# Patient Record
Sex: MALE | Race: OTHER | Employment: PART TIME | URBAN - METROPOLITAN AREA
[De-identification: names, ages, dates, MRNs, and addresses within clinical notes are randomized per-mention and may not be internally consistent; named-entity substitution may affect disease eponyms.]

---

## 2017-05-22 ENCOUNTER — MA CHART PREP (OUTPATIENT)
Dept: FAMILY MEDICINE CLINIC | Facility: CLINIC | Age: 72
End: 2017-05-22

## 2017-05-22 PROBLEM — K57.90 DIVERTICULOSIS: Status: ACTIVE | Noted: 2017-05-22

## 2017-05-22 PROBLEM — M47.816 DEGENERATIVE ARTHRITIS OF LUMBAR SPINE: Status: ACTIVE | Noted: 2017-05-22

## 2017-05-22 PROBLEM — I25.2 HISTORY OF MI (MYOCARDIAL INFARCTION): Status: ACTIVE | Noted: 2017-05-22

## 2017-05-22 PROBLEM — I10 HYPERTENSION: Status: ACTIVE | Noted: 2017-05-22

## 2017-05-22 PROBLEM — R97.20 ELEVATED PSA: Status: ACTIVE | Noted: 2017-05-22

## 2017-05-22 PROBLEM — N40.0 BPH (BENIGN PROSTATIC HYPERPLASIA): Status: ACTIVE | Noted: 2017-05-22

## 2017-05-23 ENCOUNTER — LAB ENCOUNTER (OUTPATIENT)
Dept: LAB | Age: 72
End: 2017-05-23
Attending: INTERNAL MEDICINE
Payer: MEDICARE

## 2017-05-23 ENCOUNTER — OFFICE VISIT (OUTPATIENT)
Dept: INTERNAL MEDICINE CLINIC | Facility: CLINIC | Age: 72
End: 2017-05-23

## 2017-05-23 VITALS
SYSTOLIC BLOOD PRESSURE: 124 MMHG | OXYGEN SATURATION: 96 % | RESPIRATION RATE: 16 BRPM | WEIGHT: 211.5 LBS | TEMPERATURE: 98 F | DIASTOLIC BLOOD PRESSURE: 80 MMHG | BODY MASS INDEX: 35.67 KG/M2 | HEART RATE: 81 BPM | HEIGHT: 64.5 IN

## 2017-05-23 DIAGNOSIS — D50.9 IRON DEFICIENCY ANEMIA, UNSPECIFIED IRON DEFICIENCY ANEMIA TYPE: ICD-10-CM

## 2017-05-23 DIAGNOSIS — E66.01 SEVERE OBESITY (BMI 35.0-39.9) WITH COMORBIDITY (HCC): Chronic | ICD-10-CM

## 2017-05-23 DIAGNOSIS — E78.2 MIXED HYPERLIPIDEMIA: ICD-10-CM

## 2017-05-23 DIAGNOSIS — I25.10 CORONARY ARTERY DISEASE INVOLVING NATIVE CORONARY ARTERY OF NATIVE HEART WITHOUT ANGINA PECTORIS: ICD-10-CM

## 2017-05-23 DIAGNOSIS — R73.01 IMPAIRED FASTING GLUCOSE: ICD-10-CM

## 2017-05-23 DIAGNOSIS — Z00.00 ENCOUNTER FOR ANNUAL HEALTH EXAMINATION: Primary | ICD-10-CM

## 2017-05-23 DIAGNOSIS — Z01.00 EYE EXAM, ROUTINE: ICD-10-CM

## 2017-05-23 DIAGNOSIS — Z00.00 ENCOUNTER FOR ANNUAL HEALTH EXAMINATION: ICD-10-CM

## 2017-05-23 DIAGNOSIS — R97.20 ELEVATED PSA: ICD-10-CM

## 2017-05-23 DIAGNOSIS — M47.816 FACET ARTHRITIS OF LUMBAR REGION: ICD-10-CM

## 2017-05-23 PROBLEM — I25.2 HISTORY OF MI (MYOCARDIAL INFARCTION): Status: RESOLVED | Noted: 2017-05-22 | Resolved: 2017-05-23

## 2017-05-23 PROBLEM — K57.90 DIVERTICULOSIS: Status: RESOLVED | Noted: 2017-05-22 | Resolved: 2017-05-23

## 2017-05-23 PROCEDURE — 84154 ASSAY OF PSA FREE: CPT | Performed by: INTERNAL MEDICINE

## 2017-05-23 PROCEDURE — 84450 TRANSFERASE (AST) (SGOT): CPT | Performed by: INTERNAL MEDICINE

## 2017-05-23 PROCEDURE — 80048 BASIC METABOLIC PNL TOTAL CA: CPT | Performed by: INTERNAL MEDICINE

## 2017-05-23 PROCEDURE — 80061 LIPID PANEL: CPT | Performed by: INTERNAL MEDICINE

## 2017-05-23 PROCEDURE — 84460 ALANINE AMINO (ALT) (SGPT): CPT | Performed by: INTERNAL MEDICINE

## 2017-05-23 PROCEDURE — 36415 COLL VENOUS BLD VENIPUNCTURE: CPT | Performed by: INTERNAL MEDICINE

## 2017-05-23 PROCEDURE — 99397 PER PM REEVAL EST PAT 65+ YR: CPT | Performed by: INTERNAL MEDICINE

## 2017-05-23 PROCEDURE — G0439 PPPS, SUBSEQ VISIT: HCPCS | Performed by: INTERNAL MEDICINE

## 2017-05-23 PROCEDURE — 82728 ASSAY OF FERRITIN: CPT | Performed by: INTERNAL MEDICINE

## 2017-05-23 PROCEDURE — G0403 EKG FOR INITIAL PREVENT EXAM: HCPCS | Performed by: INTERNAL MEDICINE

## 2017-05-23 PROCEDURE — 96160 PT-FOCUSED HLTH RISK ASSMT: CPT | Performed by: INTERNAL MEDICINE

## 2017-05-23 PROCEDURE — 85025 COMPLETE CBC W/AUTO DIFF WBC: CPT | Performed by: INTERNAL MEDICINE

## 2017-05-23 PROCEDURE — 83036 HEMOGLOBIN GLYCOSYLATED A1C: CPT | Performed by: INTERNAL MEDICINE

## 2017-05-23 NOTE — PATIENT INSTRUCTIONS
I do advise you get the TDAP (tetanus, diptheriae, pertussis) vaccine every 10 years but it can cost up to $65.    I advise you get the annual flu shot every September, October.     Please return in 6 months to repeat your memory test.    Please get your bl

## 2017-05-23 NOTE — PROGRESS NOTES
HPI:   Marine Peck is a 66year old male who presents for a medicare supervisit and additional issues as noted below. 1. Cognitive testing: brief screen was abnormal so I performed a full MMSE and he scored 25/30.  Sometimes he has trouble rememberi 08/16/2016   * 08/16/2016        CBC  (most recent labs)     Lab Results  Component Value Date   WBC 4.8 08/03/2016   HGB 11.8* 08/03/2016   .0 08/03/2016        ALLERGIES:   He is allergic to lipitor and pcn.     CURRENT MEDICATIONS:     No o score 0 today.    HEMATOLOGIC: has microcytic, iron deficiency anemia  ENDOCRINE: denies thyroid history  ALL/ASTHMA: denies hx of allergy or asthma    EXAM:   /80 mmHg  Pulse 81  Temp(Src) 98.2 °F (36.8 °C) (Oral)  Resp 16  Ht 64.5\"  Wt 211 lb 8 oz Below      [facility] on file in Epic:    Alexandre Burgess does not have a Power of  for [employer] on file in [facility] See Below         PLAN:  The patient indicates understanding of these issues and agrees to the plan.     No Follow- Scoring: 3    Scoring Interpretation: 0 - 3 No Risk     Depression Screening (PHQ-2/PHQ-9): Over the LAST 2 WEEKS   Little interest or pleasure in doing things (over the last two weeks)?: Not at all    Feeling down, depressed, or hopeless (over the last tw Fecal Occult Blood Annually No results found for: FOB No flowsheet data found. Glaucoma Screening      Ophthalmology Visit Annually: Diabetics, FHx Glaucoma, AA>50, > 65 No flowsheet data found.     Prostate Cancer Screening      PSA  Annuall No flowsheet data found.           ASSESSMENT/PLAN:  # Possible Cognitive Impairment: no complaints per patient and MMSE 25/30 today which is technically normal. Repeat in 6 months to look for any changes  # Impaired Fasting Glucose, Severe obesity with CAD MMSE.   Mai Dietz MD

## 2017-12-08 DIAGNOSIS — E78.2 MIXED HYPERLIPIDEMIA: ICD-10-CM

## 2017-12-08 DIAGNOSIS — I25.10 CORONARY ARTERY DISEASE INVOLVING NATIVE CORONARY ARTERY OF NATIVE HEART WITHOUT ANGINA PECTORIS: ICD-10-CM

## 2017-12-08 DIAGNOSIS — R97.20 ELEVATED PSA: Primary | ICD-10-CM

## 2017-12-11 RX ORDER — SIMVASTATIN 10 MG
10 TABLET ORAL NIGHTLY
Qty: 30 TABLET | Refills: 1
Start: 2017-12-11

## 2017-12-11 NOTE — TELEPHONE ENCOUNTER
I denied his simvastatin refill because he is overdue for labs. Please call and notify him. Orders in are in.

## 2017-12-11 NOTE — TELEPHONE ENCOUNTER
Requesting Simvastatin 10 mg  LOV: 5/23/17  RTC: 11/23/17  Last Relevant Labs:5/23/17  Filled: 5/24/17 #30 with 1 refills    Future Appointments  Date Time Provider Vikram Owens   1/17/2018 9:00 AM Nelida Steward MD [facility] [place]

## 2017-12-20 ENCOUNTER — LAB ENCOUNTER (OUTPATIENT)
Dept: LAB | Age: 72
End: 2017-12-20
Attending: INTERNAL MEDICINE
Payer: MEDICARE

## 2017-12-20 DIAGNOSIS — R97.20 ELEVATED PSA: ICD-10-CM

## 2017-12-20 DIAGNOSIS — I25.10 CORONARY ARTERY DISEASE INVOLVING NATIVE CORONARY ARTERY OF NATIVE HEART WITHOUT ANGINA PECTORIS: ICD-10-CM

## 2017-12-20 DIAGNOSIS — E78.2 MIXED HYPERLIPIDEMIA: ICD-10-CM

## 2017-12-20 PROCEDURE — 84154 ASSAY OF PSA FREE: CPT | Performed by: INTERNAL MEDICINE

## 2017-12-20 PROCEDURE — 84460 ALANINE AMINO (ALT) (SGPT): CPT | Performed by: INTERNAL MEDICINE

## 2017-12-20 PROCEDURE — 80061 LIPID PANEL: CPT | Performed by: INTERNAL MEDICINE

## 2017-12-20 PROCEDURE — 80048 BASIC METABOLIC PNL TOTAL CA: CPT | Performed by: INTERNAL MEDICINE

## 2017-12-20 PROCEDURE — 84153 ASSAY OF PSA TOTAL: CPT | Performed by: INTERNAL MEDICINE

## 2017-12-20 PROCEDURE — 36415 COLL VENOUS BLD VENIPUNCTURE: CPT | Performed by: INTERNAL MEDICINE

## 2017-12-20 PROCEDURE — 84450 TRANSFERASE (AST) (SGOT): CPT | Performed by: INTERNAL MEDICINE

## 2018-01-17 ENCOUNTER — OFFICE VISIT (OUTPATIENT)
Dept: INTERNAL MEDICINE CLINIC | Facility: CLINIC | Age: 73
End: 2018-01-17

## 2018-01-17 VITALS
BODY MASS INDEX: 34.66 KG/M2 | TEMPERATURE: 99 F | DIASTOLIC BLOOD PRESSURE: 80 MMHG | RESPIRATION RATE: 16 BRPM | OXYGEN SATURATION: 96 % | WEIGHT: 208 LBS | HEIGHT: 65 IN | SYSTOLIC BLOOD PRESSURE: 132 MMHG | HEART RATE: 66 BPM

## 2018-01-17 DIAGNOSIS — M47.816 FACET ARTHRITIS OF LUMBAR REGION: ICD-10-CM

## 2018-01-17 DIAGNOSIS — R97.20 ELEVATED PSA: ICD-10-CM

## 2018-01-17 DIAGNOSIS — E78.2 MIXED HYPERLIPIDEMIA: ICD-10-CM

## 2018-01-17 DIAGNOSIS — Z00.00 ENCOUNTER FOR ANNUAL HEALTH EXAMINATION: Primary | ICD-10-CM

## 2018-01-17 DIAGNOSIS — R73.01 IMPAIRED FASTING GLUCOSE: ICD-10-CM

## 2018-01-17 DIAGNOSIS — I25.10 CORONARY ARTERY DISEASE INVOLVING NATIVE CORONARY ARTERY OF NATIVE HEART WITHOUT ANGINA PECTORIS: ICD-10-CM

## 2018-01-17 DIAGNOSIS — E66.01 MORBIDLY OBESE (HCC): ICD-10-CM

## 2018-01-17 PROCEDURE — G0439 PPPS, SUBSEQ VISIT: HCPCS | Performed by: INTERNAL MEDICINE

## 2018-01-17 PROCEDURE — 99397 PER PM REEVAL EST PAT 65+ YR: CPT | Performed by: INTERNAL MEDICINE

## 2018-01-17 PROCEDURE — 96160 PT-FOCUSED HLTH RISK ASSMT: CPT | Performed by: INTERNAL MEDICINE

## 2018-01-17 NOTE — PROGRESS NOTES
HPI:   Brittani Caballero is a 67year old male who presents for a medicare wellness visit and additional issues as noted below. 1. HLP: doing well on simvastatin  2. Obesity: down 3 lbs from last visit.    3. Alcohol Use: would drink 6-7 beers/bloody Enrrique Siurick Nikko was screened for Alcohol abuse and had a score of 0 so is at low risk.      Patient Care Team: Patient Care Team:  Juan Ramon Al MD as PCP - General (Internal Medicine)  Marielos Phan MD as Consulting Physician ([facility] [address])    Patient Active P colonoscopy (N/A, 5/13/2016). His family history includes Diabetes in his mother. SOCIAL HISTORY:   He  reports that he has never smoked. He has never used smokeless tobacco. He reports that he drinks alcohol. He reports that he does not use drugs. edema       Visual Acuity  Right Eye Visual Acuity: Uncorrected Right Eye Chart Acuity: 20/30   Left Eye Visual Acuity: Uncorrected Left Eye Chart Acuity: 20/30   Both Eyes Visual Acuity: Uncorrected Both Eyes Chart Acuity: 20/25   Able To Tolerate Visual 12/20/2017 99   ----------  GLUCOSE (mg/dL)   Date Value   04/28/2014 105 (H)   12/07/2011 102 (H)   ----------    Cardiovascular Disease Screening     LDL Annually LDL-CHOLESTEROL (mg/dL (calc))   Date Value   12/07/2011 91     LDL CHOLESTROL (mg/dL) Medically needed    Zoster   Not covered by Medicare Part B No vaccine history found This may be covered with your pharmacy  prescription benefits       Assessment/Plan:  # Morbid Obesity, BMI 34 with co-morbidities: applauded on lifestyle changes.    # Imp

## 2018-01-17 NOTE — PATIENT INSTRUCTIONS
Goals for Weight Loss:  1. Please try to eat breakfast every morning. 2. Small, frequent meals improves your metabolism and promotes weight loss  3. Your portions should be a dinner plate.  1/2 should be vegetables, 1/4 lean protein (chicken, fish, turkey)

## 2018-03-21 ENCOUNTER — LAB ENCOUNTER (OUTPATIENT)
Dept: LAB | Age: 73
End: 2018-03-21
Attending: INTERNAL MEDICINE
Payer: MEDICARE

## 2018-03-21 DIAGNOSIS — Z00.00 ENCOUNTER FOR ANNUAL HEALTH EXAMINATION: ICD-10-CM

## 2018-03-21 DIAGNOSIS — I25.10 CORONARY ARTERY DISEASE INVOLVING NATIVE CORONARY ARTERY OF NATIVE HEART WITHOUT ANGINA PECTORIS: ICD-10-CM

## 2018-03-21 DIAGNOSIS — E78.2 MIXED HYPERLIPIDEMIA: ICD-10-CM

## 2018-03-21 DIAGNOSIS — R73.01 IMPAIRED FASTING GLUCOSE: ICD-10-CM

## 2018-03-21 DIAGNOSIS — R97.20 ELEVATED PSA: ICD-10-CM

## 2018-03-21 LAB
ALT SERPL-CCNC: 20 U/L (ref 17–63)
AST SERPL-CCNC: 25 U/L (ref 15–41)
BASOPHILS # BLD AUTO: 0.04 X10(3) UL (ref 0–0.1)
BASOPHILS NFR BLD AUTO: 0.8 %
CHOLEST SMN-MCNC: 126 MG/DL (ref ?–200)
EOSINOPHIL # BLD AUTO: 0.12 X10(3) UL (ref 0–0.3)
EOSINOPHIL NFR BLD AUTO: 2.3 %
ERYTHROCYTE [DISTWIDTH] IN BLOOD BY AUTOMATED COUNT: 17.8 % (ref 11.5–16)
EST. AVERAGE GLUCOSE BLD GHB EST-MCNC: 128 MG/DL (ref 68–126)
HBA1C MFR BLD HPLC: 6.1 % (ref ?–5.7)
HCT VFR BLD AUTO: 43 % (ref 37–53)
HDLC SERPL-MCNC: 65 MG/DL (ref 45–?)
HDLC SERPL: 1.94 {RATIO} (ref ?–4.97)
HGB BLD-MCNC: 13.5 G/DL (ref 13–17)
IMMATURE GRANULOCYTE COUNT: 0.02 X10(3) UL (ref 0–1)
IMMATURE GRANULOCYTE RATIO %: 0.4 %
LDLC SERPL CALC-MCNC: 45 MG/DL (ref ?–130)
LYMPHOCYTES # BLD AUTO: 1.83 X10(3) UL (ref 0.9–4)
LYMPHOCYTES NFR BLD AUTO: 34.8 %
MCH RBC QN AUTO: 28.4 PG (ref 27–33.2)
MCHC RBC AUTO-ENTMCNC: 31.4 G/DL (ref 31–37)
MCV RBC AUTO: 90.3 FL (ref 80–99)
MONOCYTES # BLD AUTO: 0.53 X10(3) UL (ref 0.1–1)
MONOCYTES NFR BLD AUTO: 10.1 %
NEUTROPHIL ABS PRELIM: 2.72 X10 (3) UL (ref 1.3–6.7)
NEUTROPHILS # BLD AUTO: 2.72 X10(3) UL (ref 1.3–6.7)
NEUTROPHILS NFR BLD AUTO: 51.6 %
NONHDLC SERPL-MCNC: 61 MG/DL (ref ?–130)
PLATELET # BLD AUTO: 237 10(3)UL (ref 150–450)
RBC # BLD AUTO: 4.76 X10(6)UL (ref 3.8–5.8)
RED CELL DISTRIBUTION WIDTH-SD: 59.3 FL (ref 35.1–46.3)
TRIGL SERPL-MCNC: 78 MG/DL (ref ?–150)
VLDLC SERPL CALC-MCNC: 16 MG/DL (ref 5–40)
WBC # BLD AUTO: 5.3 X10(3) UL (ref 4–13)

## 2018-03-21 PROCEDURE — 84460 ALANINE AMINO (ALT) (SGPT): CPT | Performed by: INTERNAL MEDICINE

## 2018-03-21 PROCEDURE — 36415 COLL VENOUS BLD VENIPUNCTURE: CPT | Performed by: INTERNAL MEDICINE

## 2018-03-21 PROCEDURE — 84450 TRANSFERASE (AST) (SGOT): CPT | Performed by: INTERNAL MEDICINE

## 2018-03-21 PROCEDURE — 85025 COMPLETE CBC W/AUTO DIFF WBC: CPT | Performed by: INTERNAL MEDICINE

## 2018-03-21 PROCEDURE — 80061 LIPID PANEL: CPT | Performed by: INTERNAL MEDICINE

## 2018-03-21 PROCEDURE — 83036 HEMOGLOBIN GLYCOSYLATED A1C: CPT | Performed by: INTERNAL MEDICINE

## 2018-04-15 DIAGNOSIS — E78.2 MIXED HYPERLIPIDEMIA: ICD-10-CM

## 2018-04-15 DIAGNOSIS — R97.20 ELEVATED PSA: ICD-10-CM

## 2018-04-15 DIAGNOSIS — Z00.00 ENCOUNTER FOR ANNUAL HEALTH EXAMINATION: ICD-10-CM

## 2018-04-15 DIAGNOSIS — E66.01 SEVERE OBESITY (BMI 35.0-39.9) WITH COMORBIDITY (HCC): Chronic | ICD-10-CM

## 2018-04-15 DIAGNOSIS — I25.10 CORONARY ARTERY DISEASE INVOLVING NATIVE CORONARY ARTERY OF NATIVE HEART WITHOUT ANGINA PECTORIS: ICD-10-CM

## 2018-04-15 DIAGNOSIS — R73.01 IMPAIRED FASTING GLUCOSE: ICD-10-CM

## 2018-04-16 RX ORDER — SIMVASTATIN 40 MG
TABLET ORAL
Qty: 90 TABLET | Refills: 0 | Status: SHIPPED | OUTPATIENT
Start: 2018-04-16 | End: 2019-01-16

## 2019-01-16 ENCOUNTER — OFFICE VISIT (OUTPATIENT)
Dept: INTERNAL MEDICINE CLINIC | Facility: CLINIC | Age: 74
End: 2019-01-16
Payer: COMMERCIAL

## 2019-01-16 VITALS
TEMPERATURE: 98 F | OXYGEN SATURATION: 98 % | DIASTOLIC BLOOD PRESSURE: 62 MMHG | SYSTOLIC BLOOD PRESSURE: 128 MMHG | HEIGHT: 65 IN | HEART RATE: 96 BPM | BODY MASS INDEX: 34.95 KG/M2 | RESPIRATION RATE: 16 BRPM | WEIGHT: 209.75 LBS

## 2019-01-16 DIAGNOSIS — E78.2 MIXED HYPERLIPIDEMIA: ICD-10-CM

## 2019-01-16 DIAGNOSIS — M47.816 FACET ARTHRITIS OF LUMBAR REGION: ICD-10-CM

## 2019-01-16 DIAGNOSIS — Z00.00 ENCOUNTER FOR ANNUAL HEALTH EXAMINATION: ICD-10-CM

## 2019-01-16 DIAGNOSIS — Z77.098 AGENT ORANGE EXPOSURE: ICD-10-CM

## 2019-01-16 DIAGNOSIS — E66.01 MORBIDLY OBESE (HCC): ICD-10-CM

## 2019-01-16 DIAGNOSIS — E66.01 SEVERE OBESITY (BMI 35.0-39.9) WITH COMORBIDITY (HCC): Chronic | ICD-10-CM

## 2019-01-16 DIAGNOSIS — R73.01 IMPAIRED FASTING GLUCOSE: ICD-10-CM

## 2019-01-16 DIAGNOSIS — N40.0 BENIGN PROSTATIC HYPERPLASIA WITHOUT LOWER URINARY TRACT SYMPTOMS: Primary | ICD-10-CM

## 2019-01-16 DIAGNOSIS — I25.10 CORONARY ARTERY DISEASE INVOLVING NATIVE CORONARY ARTERY OF NATIVE HEART WITHOUT ANGINA PECTORIS: ICD-10-CM

## 2019-01-16 DIAGNOSIS — R97.20 ELEVATED PSA: ICD-10-CM

## 2019-01-16 PROCEDURE — 96160 PT-FOCUSED HLTH RISK ASSMT: CPT | Performed by: INTERNAL MEDICINE

## 2019-01-16 PROCEDURE — 99397 PER PM REEVAL EST PAT 65+ YR: CPT | Performed by: INTERNAL MEDICINE

## 2019-01-16 PROCEDURE — G0439 PPPS, SUBSEQ VISIT: HCPCS | Performed by: INTERNAL MEDICINE

## 2019-01-16 RX ORDER — SIMVASTATIN 40 MG
TABLET ORAL
Qty: 90 TABLET | Refills: 1 | Status: SHIPPED | OUTPATIENT
Start: 2019-01-16 | End: 2019-08-15

## 2019-01-16 NOTE — PROGRESS NOTES
Brandon Whitlock is a 75year old male. HPI:   Patient presents for medicare supervisit and additional issues noted below. 1. HLP: has not been on simvastatin in several monts. Just didn't ask for a refill.   2. 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 Normal stress test in 5/2014. Follow for any symptoms. Cont daily ASA 81 mg  # HLP: needs to resume simvastatin and repeat lipids in 6 weeks. # Alcohol Dependence: applauded on cutting down!  Cont to support  # Elevated PSA, BPH (by Joy Ferraro

## 2019-01-16 NOTE — PATIENT INSTRUCTIONS
Please resume the simvastatin 40 mg every day. Please have your fasting labs checked 6 weeks after resuming the simvastatin. You need 3 eight ounce servings of calcium/vitamin D daily.  This includes spinach, kale, broccoli, almonds, milk, yogurt, or co

## 2019-03-18 ENCOUNTER — LAB ENCOUNTER (OUTPATIENT)
Dept: LAB | Age: 74
End: 2019-03-18
Attending: INTERNAL MEDICINE
Payer: MEDICARE

## 2019-03-18 DIAGNOSIS — E66.01 SEVERE OBESITY (BMI 35.0-39.9) WITH COMORBIDITY (HCC): ICD-10-CM

## 2019-03-18 DIAGNOSIS — Z00.00 ENCOUNTER FOR ANNUAL HEALTH EXAMINATION: ICD-10-CM

## 2019-03-18 DIAGNOSIS — R73.01 IMPAIRED FASTING GLUCOSE: ICD-10-CM

## 2019-03-18 DIAGNOSIS — N40.0 BENIGN PROSTATIC HYPERPLASIA WITHOUT LOWER URINARY TRACT SYMPTOMS: ICD-10-CM

## 2019-03-18 DIAGNOSIS — R97.20 ELEVATED PSA: ICD-10-CM

## 2019-03-18 DIAGNOSIS — E78.2 MIXED HYPERLIPIDEMIA: ICD-10-CM

## 2019-03-18 DIAGNOSIS — I25.10 CORONARY ARTERY DISEASE INVOLVING NATIVE CORONARY ARTERY OF NATIVE HEART WITHOUT ANGINA PECTORIS: ICD-10-CM

## 2019-03-18 LAB
ALT SERPL-CCNC: 24 U/L (ref 16–61)
ANION GAP SERPL CALC-SCNC: 5 MMOL/L (ref 0–18)
AST SERPL-CCNC: 34 U/L (ref 15–37)
BASOPHILS # BLD AUTO: 0.04 X10(3) UL (ref 0–0.2)
BASOPHILS NFR BLD AUTO: 0.7 %
BUN BLD-MCNC: 17 MG/DL (ref 7–18)
BUN/CREAT SERPL: 17.5 (ref 10–20)
CALCIUM BLD-MCNC: 8.2 MG/DL (ref 8.5–10.1)
CHLORIDE SERPL-SCNC: 108 MMOL/L (ref 98–107)
CHOLEST SMN-MCNC: 141 MG/DL (ref ?–200)
CO2 SERPL-SCNC: 27 MMOL/L (ref 21–32)
CREAT BLD-MCNC: 0.97 MG/DL (ref 0.7–1.3)
DEPRECATED RDW RBC AUTO: 57.2 FL (ref 35.1–46.3)
EOSINOPHIL # BLD AUTO: 0.09 X10(3) UL (ref 0–0.7)
EOSINOPHIL NFR BLD AUTO: 1.6 %
ERYTHROCYTE [DISTWIDTH] IN BLOOD BY AUTOMATED COUNT: 16.9 % (ref 11–15)
EST. AVERAGE GLUCOSE BLD GHB EST-MCNC: 134 MG/DL (ref 68–126)
GLUCOSE BLD-MCNC: 89 MG/DL (ref 70–99)
HBA1C MFR BLD HPLC: 6.3 % (ref ?–5.7)
HCT VFR BLD AUTO: 43.4 % (ref 39–53)
HDLC SERPL-MCNC: 71 MG/DL (ref 40–59)
HGB BLD-MCNC: 13.5 G/DL (ref 13–17.5)
IMM GRANULOCYTES # BLD AUTO: 0.01 X10(3) UL (ref 0–1)
IMM GRANULOCYTES NFR BLD: 0.2 %
LDLC SERPL CALC-MCNC: 56 MG/DL (ref ?–100)
LYMPHOCYTES # BLD AUTO: 1.71 X10(3) UL (ref 1–4)
LYMPHOCYTES NFR BLD AUTO: 30.9 %
MCH RBC QN AUTO: 28.4 PG (ref 26–34)
MCHC RBC AUTO-ENTMCNC: 31.1 G/DL (ref 31–37)
MCV RBC AUTO: 91.2 FL (ref 80–100)
MONOCYTES # BLD AUTO: 0.52 X10(3) UL (ref 0.1–1)
MONOCYTES NFR BLD AUTO: 9.4 %
NEUTROPHILS # BLD AUTO: 3.17 X10 (3) UL (ref 1.5–7.7)
NEUTROPHILS # BLD AUTO: 3.17 X10(3) UL (ref 1.5–7.7)
NEUTROPHILS NFR BLD AUTO: 57.2 %
NONHDLC SERPL-MCNC: 70 MG/DL (ref ?–130)
OSMOLALITY SERPL CALC.SUM OF ELEC: 291 MOSM/KG (ref 275–295)
PLATELET # BLD AUTO: 165 10(3)UL (ref 150–450)
POTASSIUM SERPL-SCNC: 4.6 MMOL/L (ref 3.5–5.1)
PSA FREE MFR SERPL: 19 %
PSA FREE SERPL-MCNC: 0.85 NG/ML
PSA SERPL-MCNC: 4.77 NG/ML (ref ?–4)
RBC # BLD AUTO: 4.76 X10(6)UL (ref 3.8–5.8)
SODIUM SERPL-SCNC: 140 MMOL/L (ref 136–145)
TRIGL SERPL-MCNC: 70 MG/DL (ref 30–149)
VLDLC SERPL CALC-MCNC: 14 MG/DL (ref 0–30)
WBC # BLD AUTO: 5.5 X10(3) UL (ref 4–11)

## 2019-03-18 PROCEDURE — 84154 ASSAY OF PSA FREE: CPT

## 2019-03-18 PROCEDURE — 85025 COMPLETE CBC W/AUTO DIFF WBC: CPT

## 2019-03-18 PROCEDURE — 80061 LIPID PANEL: CPT

## 2019-03-18 PROCEDURE — 84460 ALANINE AMINO (ALT) (SGPT): CPT

## 2019-03-18 PROCEDURE — 84153 ASSAY OF PSA TOTAL: CPT

## 2019-03-18 PROCEDURE — 83036 HEMOGLOBIN GLYCOSYLATED A1C: CPT

## 2019-03-18 PROCEDURE — 36415 COLL VENOUS BLD VENIPUNCTURE: CPT

## 2019-03-18 PROCEDURE — 80048 BASIC METABOLIC PNL TOTAL CA: CPT

## 2019-03-18 PROCEDURE — 84450 TRANSFERASE (AST) (SGOT): CPT

## 2019-03-22 ENCOUNTER — TELEPHONE (OUTPATIENT)
Dept: INTERNAL MEDICINE CLINIC | Facility: CLINIC | Age: 74
End: 2019-03-22

## 2019-03-22 DIAGNOSIS — E83.51 LOW CALCIUM LEVELS: Primary | ICD-10-CM

## 2019-03-22 NOTE — TELEPHONE ENCOUNTER
Notes recorded by Theodore Talamantes MD on 3/19/2019 at 8:46 AM CDT  Please call patient and review [facility] message with him. He needs to be seeing urology. His calcium level is a touch low. I just want to repeat it in 4-6 weeks.  Please enter order for ionized

## 2019-08-15 DIAGNOSIS — E78.2 MIXED HYPERLIPIDEMIA: ICD-10-CM

## 2019-08-15 DIAGNOSIS — E66.01 SEVERE OBESITY (BMI 35.0-39.9) WITH COMORBIDITY (HCC): Chronic | ICD-10-CM

## 2019-08-15 DIAGNOSIS — Z00.00 ENCOUNTER FOR ANNUAL HEALTH EXAMINATION: ICD-10-CM

## 2019-08-15 DIAGNOSIS — R73.01 IMPAIRED FASTING GLUCOSE: ICD-10-CM

## 2019-08-15 DIAGNOSIS — I25.10 CORONARY ARTERY DISEASE INVOLVING NATIVE CORONARY ARTERY OF NATIVE HEART WITHOUT ANGINA PECTORIS: ICD-10-CM

## 2019-08-15 DIAGNOSIS — R97.20 ELEVATED PSA: ICD-10-CM

## 2019-08-15 RX ORDER — SIMVASTATIN 40 MG
TABLET ORAL
Qty: 90 TABLET | Refills: 0 | Status: SHIPPED | OUTPATIENT
Start: 2019-08-15 | End: 2019-11-12

## 2019-08-15 NOTE — TELEPHONE ENCOUNTER
Simvastatin 40 mg 1 tab nightly filled 1-16-19 90 with 1 refill     LOV 1-16-19   HLP: has not been on simvastatin in several monts.  Just didn't ask for a refill  HLP: needs to resume simvastatin and repeat lipids in 6 weeks  return in 1 year for medicare

## 2019-10-09 ENCOUNTER — APPOINTMENT (OUTPATIENT)
Dept: LAB | Age: 74
End: 2019-10-09
Attending: INTERNAL MEDICINE
Payer: MEDICARE

## 2019-10-09 ENCOUNTER — OFFICE VISIT (OUTPATIENT)
Dept: INTERNAL MEDICINE CLINIC | Facility: CLINIC | Age: 74
End: 2019-10-09
Payer: COMMERCIAL

## 2019-10-09 VITALS
HEART RATE: 67 BPM | SYSTOLIC BLOOD PRESSURE: 122 MMHG | OXYGEN SATURATION: 98 % | WEIGHT: 206.5 LBS | RESPIRATION RATE: 18 BRPM | DIASTOLIC BLOOD PRESSURE: 70 MMHG | HEIGHT: 65 IN | BODY MASS INDEX: 34.41 KG/M2 | TEMPERATURE: 98 F

## 2019-10-09 DIAGNOSIS — R21 RASH AND NONSPECIFIC SKIN ERUPTION: Primary | ICD-10-CM

## 2019-10-09 DIAGNOSIS — I25.10 CORONARY ARTERY DISEASE INVOLVING NATIVE CORONARY ARTERY OF NATIVE HEART WITHOUT ANGINA PECTORIS: ICD-10-CM

## 2019-10-09 DIAGNOSIS — R73.01 IMPAIRED FASTING GLUCOSE: ICD-10-CM

## 2019-10-09 DIAGNOSIS — E78.2 MIXED HYPERLIPIDEMIA: ICD-10-CM

## 2019-10-09 DIAGNOSIS — E83.51 LOW CALCIUM LEVELS: ICD-10-CM

## 2019-10-09 PROCEDURE — 82330 ASSAY OF CALCIUM: CPT

## 2019-10-09 PROCEDURE — 84460 ALANINE AMINO (ALT) (SGPT): CPT | Performed by: INTERNAL MEDICINE

## 2019-10-09 PROCEDURE — 80048 BASIC METABOLIC PNL TOTAL CA: CPT | Performed by: INTERNAL MEDICINE

## 2019-10-09 PROCEDURE — 36415 COLL VENOUS BLD VENIPUNCTURE: CPT | Performed by: INTERNAL MEDICINE

## 2019-10-09 PROCEDURE — 83036 HEMOGLOBIN GLYCOSYLATED A1C: CPT | Performed by: INTERNAL MEDICINE

## 2019-10-09 PROCEDURE — 84450 TRANSFERASE (AST) (SGOT): CPT | Performed by: INTERNAL MEDICINE

## 2019-10-09 PROCEDURE — G0008 ADMIN INFLUENZA VIRUS VAC: HCPCS | Performed by: INTERNAL MEDICINE

## 2019-10-09 PROCEDURE — 90662 IIV NO PRSV INCREASED AG IM: CPT | Performed by: INTERNAL MEDICINE

## 2019-10-09 PROCEDURE — 99214 OFFICE O/P EST MOD 30 MIN: CPT | Performed by: INTERNAL MEDICINE

## 2019-10-09 RX ORDER — BETAMETHASONE DIPROPIONATE 0.5 MG/G
1 CREAM TOPICAL 2 TIMES DAILY PRN
Qty: 15 G | Refills: 0 | Status: SHIPPED | OUTPATIENT
Start: 2019-10-09 | End: 2020-09-02

## 2019-10-09 NOTE — PROGRESS NOTES
Alexandre Burgess is a 75year old male. HPI:   Patient presents for the following issues. 1. Rash: first noticed it a couple months ago on his left arm.  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 Trying to makes lifestyle changes. # CADz: states he had an MI in 2005 but cadiac cath showed mild disease so no stent was placed. Normal stress test in 5/2014. Follow for any symptoms.  Cont daily ASA 81 mg  # HLP: continue s

## 2019-10-09 NOTE — PATIENT INSTRUCTIONS
Please complete fasting labs as soon as possible. Please focus on eating healthy plant based nutrition which includes fresh vegetables, fresh fruits, lentils and legumes, and whole grains.     On your skin, please only use [employer] Jaime and use cetaphil

## 2019-11-12 DIAGNOSIS — R97.20 ELEVATED PSA: ICD-10-CM

## 2019-11-12 DIAGNOSIS — Z00.00 ENCOUNTER FOR ANNUAL HEALTH EXAMINATION: ICD-10-CM

## 2019-11-12 DIAGNOSIS — E78.2 MIXED HYPERLIPIDEMIA: ICD-10-CM

## 2019-11-12 DIAGNOSIS — E66.01 SEVERE OBESITY (BMI 35.0-39.9) WITH COMORBIDITY (HCC): Chronic | ICD-10-CM

## 2019-11-12 DIAGNOSIS — I25.10 CORONARY ARTERY DISEASE INVOLVING NATIVE CORONARY ARTERY OF NATIVE HEART WITHOUT ANGINA PECTORIS: ICD-10-CM

## 2019-11-12 DIAGNOSIS — R73.01 IMPAIRED FASTING GLUCOSE: ICD-10-CM

## 2019-11-13 RX ORDER — SIMVASTATIN 40 MG
TABLET ORAL
Qty: 90 TABLET | Refills: 0 | Status: SHIPPED | OUTPATIENT
Start: 2019-11-13 | End: 2020-02-17

## 2019-11-13 NOTE — TELEPHONE ENCOUNTER
SIMVASTATIN 40 MG    Last OV relevant to medication: 10-9-2019     Last refill date: 8- # 90 tabs with 0 refills     When pt was asked to return for OV: 3 months     Upcoming appt/reason: none     Labs: 10-9-2019 # a1c, alt, ast, bmp

## 2020-02-16 DIAGNOSIS — E78.2 MIXED HYPERLIPIDEMIA: ICD-10-CM

## 2020-02-16 DIAGNOSIS — R97.20 ELEVATED PSA: ICD-10-CM

## 2020-02-16 DIAGNOSIS — E66.01 SEVERE OBESITY (BMI 35.0-39.9) WITH COMORBIDITY (HCC): Chronic | ICD-10-CM

## 2020-02-16 DIAGNOSIS — I25.10 CORONARY ARTERY DISEASE INVOLVING NATIVE CORONARY ARTERY OF NATIVE HEART WITHOUT ANGINA PECTORIS: ICD-10-CM

## 2020-02-16 DIAGNOSIS — Z00.00 ENCOUNTER FOR ANNUAL HEALTH EXAMINATION: ICD-10-CM

## 2020-02-16 DIAGNOSIS — R73.01 IMPAIRED FASTING GLUCOSE: ICD-10-CM

## 2020-02-17 RX ORDER — SIMVASTATIN 40 MG
TABLET ORAL
Qty: 90 TABLET | Refills: 2 | Status: SHIPPED | OUTPATIENT
Start: 2020-02-17 | End: 2021-09-03

## 2020-02-17 NOTE — TELEPHONE ENCOUNTER
SIMVASTATIN 40 MG     Last OV relevant to medication:10-9-2019      Last refill date: 11- #90 tabs with 0 refills      When pt was asked to return for OV: 3 months      Upcoming appt/reason:none     Labs:10-9-2019-a1c, bmp, alt, ast     HLP: continue simvastatin. Due for labs now.

## 2020-07-28 ENCOUNTER — TELEPHONE (OUTPATIENT)
Dept: CASE MANAGEMENT | Age: 75
End: 2020-07-28

## 2020-09-02 ENCOUNTER — OFFICE VISIT (OUTPATIENT)
Dept: INTERNAL MEDICINE CLINIC | Facility: CLINIC | Age: 75
End: 2020-09-02
Payer: COMMERCIAL

## 2020-09-02 VITALS
WEIGHT: 209.81 LBS | BODY MASS INDEX: 34.96 KG/M2 | HEART RATE: 74 BPM | HEIGHT: 65 IN | SYSTOLIC BLOOD PRESSURE: 120 MMHG | OXYGEN SATURATION: 98 % | TEMPERATURE: 99 F | DIASTOLIC BLOOD PRESSURE: 70 MMHG | RESPIRATION RATE: 16 BRPM

## 2020-09-02 DIAGNOSIS — Z00.00 ROUTINE GENERAL MEDICAL EXAMINATION AT A HEALTH CARE FACILITY: Primary | ICD-10-CM

## 2020-09-02 DIAGNOSIS — E78.2 MIXED HYPERLIPIDEMIA: ICD-10-CM

## 2020-09-02 DIAGNOSIS — E66.01 MORBIDLY OBESE (HCC): ICD-10-CM

## 2020-09-02 DIAGNOSIS — R97.20 ELEVATED PSA: ICD-10-CM

## 2020-09-02 DIAGNOSIS — D50.9 IRON DEFICIENCY ANEMIA, UNSPECIFIED IRON DEFICIENCY ANEMIA TYPE: ICD-10-CM

## 2020-09-02 DIAGNOSIS — I25.10 CORONARY ARTERY DISEASE INVOLVING NATIVE CORONARY ARTERY OF NATIVE HEART WITHOUT ANGINA PECTORIS: ICD-10-CM

## 2020-09-02 DIAGNOSIS — R73.01 IMPAIRED FASTING GLUCOSE: ICD-10-CM

## 2020-09-02 PROCEDURE — 3078F DIAST BP <80 MM HG: CPT | Performed by: INTERNAL MEDICINE

## 2020-09-02 PROCEDURE — 96160 PT-FOCUSED HLTH RISK ASSMT: CPT | Performed by: INTERNAL MEDICINE

## 2020-09-02 PROCEDURE — 3008F BODY MASS INDEX DOCD: CPT | Performed by: INTERNAL MEDICINE

## 2020-09-02 PROCEDURE — G0439 PPPS, SUBSEQ VISIT: HCPCS | Performed by: INTERNAL MEDICINE

## 2020-09-02 PROCEDURE — 99397 PER PM REEVAL EST PAT 65+ YR: CPT | Performed by: INTERNAL MEDICINE

## 2020-09-02 PROCEDURE — 3074F SYST BP LT 130 MM HG: CPT | Performed by: INTERNAL MEDICINE

## 2020-09-02 NOTE — PATIENT INSTRUCTIONS
Please schedule your test by utilizing one of the following options:  • Log into your [facility] account to schedule an appointment  • Schedule online by accessing [facility] [employer]  • Call Central Scheduling to schedule an appointment

## 2020-09-02 NOTE — PROGRESS NOTES
Ji Blas is a 67year old male. HPI:   Patient presents for medicare supervisit and additional issues noted below. 1. HLP: tolerating simvastatin. He is overdue for his labs  2. Feels his toenails are getting harder and thicker.  Has not tried an Dermatophytosis of nail     onychomycosis   • Diverticulosis    • Glucose intolerance (pre-diabetes)    • Heart attack ([facility] [address] 2001   • High cholesterol    • Other and unspecified hyperlipidemia    • Unspecified essential hypertension       Past Surgical His Obesity, BMI 34 with co-morbidities: Counseled again on healthy plant based nutrition, regular exercise, and practicing mindfulness. We outlined small, achievable goals.    # Impaired Fasting Glucose, Severe obesity with CADz, BMI 35: weight loss is paramou

## 2020-09-11 ENCOUNTER — APPOINTMENT (OUTPATIENT)
Dept: LAB | Age: 75
End: 2020-09-11
Attending: INTERNAL MEDICINE
Payer: MEDICARE

## 2020-09-11 LAB
ALT SERPL-CCNC: 22 U/L (ref 16–61)
ANION GAP SERPL CALC-SCNC: 3 MMOL/L (ref 0–18)
AST SERPL-CCNC: 26 U/L (ref 15–37)
BASOPHILS # BLD AUTO: 0.04 X10(3) UL (ref 0–0.2)
BASOPHILS NFR BLD AUTO: 0.8 %
BUN BLD-MCNC: 15 MG/DL (ref 7–18)
BUN/CREAT SERPL: 13.4 (ref 10–20)
CALCIUM BLD-MCNC: 9.1 MG/DL (ref 8.5–10.1)
CHLORIDE SERPL-SCNC: 107 MMOL/L (ref 98–112)
CHOLEST SMN-MCNC: 140 MG/DL (ref ?–200)
CO2 SERPL-SCNC: 29 MMOL/L (ref 21–32)
CREAT BLD-MCNC: 1.12 MG/DL (ref 0.7–1.3)
DEPRECATED HBV CORE AB SER IA-ACNC: 5 NG/ML (ref 30–530)
DEPRECATED RDW RBC AUTO: 64.5 FL (ref 35.1–46.3)
EOSINOPHIL # BLD AUTO: 0.11 X10(3) UL (ref 0–0.7)
EOSINOPHIL NFR BLD AUTO: 2.1 %
ERYTHROCYTE [DISTWIDTH] IN BLOOD BY AUTOMATED COUNT: 20.7 % (ref 11–15)
EST. AVERAGE GLUCOSE BLD GHB EST-MCNC: 131 MG/DL (ref 68–126)
FOLATE SERPL-MCNC: 16.3 NG/ML (ref 8.7–?)
GLUCOSE BLD-MCNC: 96 MG/DL (ref 70–99)
HBA1C MFR BLD HPLC: 6.2 % (ref ?–5.7)
HCT VFR BLD AUTO: 41.2 % (ref 39–53)
HDLC SERPL-MCNC: 75 MG/DL (ref 40–59)
HGB BLD-MCNC: 12.1 G/DL (ref 13–17.5)
IMM GRANULOCYTES # BLD AUTO: 0.01 X10(3) UL (ref 0–1)
IMM GRANULOCYTES NFR BLD: 0.2 %
IRON SATURATION: 7 % (ref 20–50)
IRON SERPL-MCNC: 38 UG/DL (ref 65–175)
LDLC SERPL CALC-MCNC: 42 MG/DL (ref ?–100)
LYMPHOCYTES # BLD AUTO: 1.76 X10(3) UL (ref 1–4)
LYMPHOCYTES NFR BLD AUTO: 34.2 %
MCH RBC QN AUTO: 25.2 PG (ref 26–34)
MCHC RBC AUTO-ENTMCNC: 29.4 G/DL (ref 31–37)
MCV RBC AUTO: 85.7 FL (ref 80–100)
MONOCYTES # BLD AUTO: 0.52 X10(3) UL (ref 0.1–1)
MONOCYTES NFR BLD AUTO: 10.1 %
NEUTROPHILS # BLD AUTO: 2.7 X10 (3) UL (ref 1.5–7.7)
NEUTROPHILS # BLD AUTO: 2.7 X10(3) UL (ref 1.5–7.7)
NEUTROPHILS NFR BLD AUTO: 52.6 %
NONHDLC SERPL-MCNC: 65 MG/DL (ref ?–130)
OSMOLALITY SERPL CALC.SUM OF ELEC: 289 MOSM/KG (ref 275–295)
PATIENT FASTING Y/N/NP: YES
PATIENT FASTING Y/N/NP: YES
PLATELET # BLD AUTO: 267 10(3)UL (ref 150–450)
POTASSIUM SERPL-SCNC: 4.6 MMOL/L (ref 3.5–5.1)
PSA SERPL-MCNC: 4.84 NG/ML (ref ?–4)
RBC # BLD AUTO: 4.81 X10(6)UL (ref 3.8–5.8)
SODIUM SERPL-SCNC: 139 MMOL/L (ref 136–145)
TOTAL IRON BINDING CAPACITY: 517 UG/DL (ref 240–450)
TRANSFERRIN SERPL-MCNC: 347 MG/DL (ref 200–360)
TRIGL SERPL-MCNC: 114 MG/DL (ref 30–149)
VIT B12 SERPL-MCNC: 452 PG/ML (ref 193–986)
VLDLC SERPL CALC-MCNC: 23 MG/DL (ref 0–30)
WBC # BLD AUTO: 5.1 X10(3) UL (ref 4–11)

## 2020-09-11 PROCEDURE — 84153 ASSAY OF PSA TOTAL: CPT | Performed by: INTERNAL MEDICINE

## 2020-09-11 PROCEDURE — 82728 ASSAY OF FERRITIN: CPT | Performed by: INTERNAL MEDICINE

## 2020-09-11 PROCEDURE — 83550 IRON BINDING TEST: CPT | Performed by: INTERNAL MEDICINE

## 2020-09-11 PROCEDURE — 84460 ALANINE AMINO (ALT) (SGPT): CPT | Performed by: INTERNAL MEDICINE

## 2020-09-11 PROCEDURE — 82607 VITAMIN B-12: CPT | Performed by: INTERNAL MEDICINE

## 2020-09-11 PROCEDURE — 82746 ASSAY OF FOLIC ACID SERUM: CPT | Performed by: INTERNAL MEDICINE

## 2020-09-11 PROCEDURE — 83036 HEMOGLOBIN GLYCOSYLATED A1C: CPT | Performed by: INTERNAL MEDICINE

## 2020-09-11 PROCEDURE — 85025 COMPLETE CBC W/AUTO DIFF WBC: CPT | Performed by: INTERNAL MEDICINE

## 2020-09-11 PROCEDURE — 83540 ASSAY OF IRON: CPT | Performed by: INTERNAL MEDICINE

## 2020-09-11 PROCEDURE — 36415 COLL VENOUS BLD VENIPUNCTURE: CPT | Performed by: INTERNAL MEDICINE

## 2020-09-11 PROCEDURE — 80048 BASIC METABOLIC PNL TOTAL CA: CPT | Performed by: INTERNAL MEDICINE

## 2020-09-11 PROCEDURE — 84450 TRANSFERASE (AST) (SGOT): CPT | Performed by: INTERNAL MEDICINE

## 2020-09-11 PROCEDURE — 80061 LIPID PANEL: CPT | Performed by: INTERNAL MEDICINE

## 2020-10-14 RX ORDER — DEXTROSE MONOHYDRATE 25 G/50ML
50 INJECTION, SOLUTION INTRAVENOUS
Status: CANCELLED | OUTPATIENT
Start: 2020-10-14

## 2020-10-17 ENCOUNTER — APPOINTMENT (OUTPATIENT)
Dept: LAB | Facility: HOSPITAL | Age: 75
End: 2020-10-17
Attending: INTERNAL MEDICINE
Payer: MEDICARE

## 2020-10-17 DIAGNOSIS — D64.9 ANEMIA: ICD-10-CM

## 2020-10-20 ENCOUNTER — ANESTHESIA EVENT (OUTPATIENT)
Dept: ENDOSCOPY | Facility: HOSPITAL | Age: 75
End: 2020-10-20
Payer: MEDICARE

## 2020-10-20 ENCOUNTER — ANESTHESIA (OUTPATIENT)
Dept: ENDOSCOPY | Facility: HOSPITAL | Age: 75
End: 2020-10-20
Payer: MEDICARE

## 2020-10-20 ENCOUNTER — HOSPITAL ENCOUNTER (OUTPATIENT)
Facility: HOSPITAL | Age: 75
Setting detail: HOSPITAL OUTPATIENT SURGERY
Discharge: HOME | End: 2020-10-20
Attending: INTERNAL MEDICINE | Admitting: INTERNAL MEDICINE
Payer: MEDICARE

## 2020-10-20 VITALS
WEIGHT: 211 LBS | TEMPERATURE: 98 F | OXYGEN SATURATION: 100 % | DIASTOLIC BLOOD PRESSURE: 84 MMHG | RESPIRATION RATE: 18 BRPM | SYSTOLIC BLOOD PRESSURE: 128 MMHG | BODY MASS INDEX: 35.16 KG/M2 | HEIGHT: 65 IN | HEART RATE: 53 BPM

## 2020-10-20 DIAGNOSIS — D50.9 IRON DEFICIENCY ANEMIA, UNSPECIFIED IRON DEFICIENCY ANEMIA TYPE: ICD-10-CM

## 2020-10-20 DIAGNOSIS — D64.9 ANEMIA: Primary | ICD-10-CM

## 2020-10-20 PROCEDURE — 88305 TISSUE EXAM BY PATHOLOGIST: CPT | Performed by: INTERNAL MEDICINE

## 2020-10-20 PROCEDURE — 0DB98ZX EXCISION OF DUODENUM, VIA NATURAL OR ARTIFICIAL OPENING ENDOSCOPIC, DIAGNOSTIC: ICD-10-PCS | Performed by: INTERNAL MEDICINE

## 2020-10-20 PROCEDURE — 0DJD8ZZ INSPECTION OF LOWER INTESTINAL TRACT, VIA NATURAL OR ARTIFICIAL OPENING ENDOSCOPIC: ICD-10-PCS | Performed by: INTERNAL MEDICINE

## 2020-10-20 PROCEDURE — 0DB68ZX EXCISION OF STOMACH, VIA NATURAL OR ARTIFICIAL OPENING ENDOSCOPIC, DIAGNOSTIC: ICD-10-PCS | Performed by: INTERNAL MEDICINE

## 2020-10-20 RX ORDER — DEXTROSE MONOHYDRATE 25 G/50ML
50 INJECTION, SOLUTION INTRAVENOUS
Status: DISCONTINUED | OUTPATIENT
Start: 2020-10-20 | End: 2020-10-20

## 2020-10-20 RX ORDER — SODIUM CHLORIDE, SODIUM LACTATE, POTASSIUM CHLORIDE, CALCIUM CHLORIDE 600; 310; 30; 20 MG/100ML; MG/100ML; MG/100ML; MG/100ML
INJECTION, SOLUTION INTRAVENOUS CONTINUOUS
Status: DISCONTINUED | OUTPATIENT
Start: 2020-10-20 | End: 2020-10-20

## 2020-10-20 RX ORDER — NALOXONE HYDROCHLORIDE 0.4 MG/ML
80 INJECTION, SOLUTION INTRAMUSCULAR; INTRAVENOUS; SUBCUTANEOUS AS NEEDED
Status: DISCONTINUED | OUTPATIENT
Start: 2020-10-20 | End: 2020-10-20

## 2020-10-20 NOTE — ANESTHESIA PREPROCEDURE EVALUATION
PRE-OP EVALUATION    Patient Name: Thomas Anderson    Pre-op Diagnosis: Iron deficiency anemia, unspecified iron deficiency anemia type [D50.9]    Procedure(s):  COLONOSCOPY, ESOPHAGOGASTRODUODENOSCOPY      Surgeon(s) and Role:     Flakita Church MD - Musa Vega per session: 3 or 4      Binge frequency: Monthly      Drug use: No     Available pre-op labs reviewed.   Lab Results   Component Value Date    WBC 5.1 09/11/2020    RBC 4.81 09/11/2020    HGB 12.1 (L) 09/11/2020    HCT 41.2 09/11/2020    MCV 85.7 09/11/202

## 2020-10-20 NOTE — OPERATIVE REPORT
EGD/Colonoscopy Operative Report    [facility]    1945 MRN [id number]   [facility] ENDOSCOPY Attending Marika Capps MD   [facility] Day #   0 P examination, the Adult colonoscope was inserted into the rectum and advanced to the level of the cecum under direct visualization. The cecum was identified by landmarks, including the appendiceal orifice and ileoceccal valve.  Careful examination of the ent

## 2020-10-20 NOTE — ANESTHESIA POSTPROCEDURE EVALUATION
[facility] Patient Status:  Hospital Outpatient Surgery   Age/Gender 67year old male MRN [id number]   Location [facility]. Attending Marlon Carrion MD   Hosp Day # 0 PCP Radha Mccoy MD       Anesthesia Post-op Note

## 2020-10-20 NOTE — H&P
Gastroenterology & Hepatology  Outpatient Clinic Note     Chief Complaint:anemia     HPI:    66 yo M with elevated BMI, CAD, HLD here for anemia. Hyperplastic polyp in 2016. No prior EGD. No unintentional weight loss. No brbpr or black stool.  He has Binge frequency: Monthly    Drug use: No       Lipitor [Atorvastat*    MYALGIA    Comment:TABS  Pcn [Penicillins]       HIVES, RASH   No current outpatient medications on file. PE:  Vitals:  BP (!) 138/100 (BP Location: Left arm)   Pulse 73   Temp 98. reviewed by myself on 10/20/20, the patient was examined and no significant changes have occurred in the patient's condition since the H&P was performed.   I discussed with the patient and/or legal representative the potential benefits, risks and side effec

## 2020-10-21 NOTE — PROGRESS NOTES
10/21/2020  [address] [address] [address] [facility] [address]    Dear Sharyle Argue,       Here are the biopsy/pathology findings from your recent EGD (Upper  Endoscopy):    a normal biopsy of the stomach with no evidence of inflammation, a negative test fo

## 2021-02-03 DIAGNOSIS — Z23 NEED FOR VACCINATION: ICD-10-CM

## 2021-03-10 DIAGNOSIS — Z00.00 ENCOUNTER FOR ANNUAL HEALTH EXAMINATION: ICD-10-CM

## 2021-03-10 DIAGNOSIS — E66.01 SEVERE OBESITY (BMI 35.0-39.9) WITH COMORBIDITY (HCC): Chronic | ICD-10-CM

## 2021-03-10 DIAGNOSIS — R97.20 ELEVATED PSA: ICD-10-CM

## 2021-03-10 DIAGNOSIS — I25.10 CORONARY ARTERY DISEASE INVOLVING NATIVE CORONARY ARTERY OF NATIVE HEART WITHOUT ANGINA PECTORIS: ICD-10-CM

## 2021-03-10 DIAGNOSIS — R73.01 IMPAIRED FASTING GLUCOSE: ICD-10-CM

## 2021-03-10 DIAGNOSIS — E78.2 MIXED HYPERLIPIDEMIA: ICD-10-CM

## 2021-03-11 DIAGNOSIS — Z00.00 ENCOUNTER FOR ANNUAL HEALTH EXAMINATION: ICD-10-CM

## 2021-03-11 DIAGNOSIS — R97.20 ELEVATED PSA: ICD-10-CM

## 2021-03-11 DIAGNOSIS — E78.2 MIXED HYPERLIPIDEMIA: ICD-10-CM

## 2021-03-11 DIAGNOSIS — R73.01 IMPAIRED FASTING GLUCOSE: ICD-10-CM

## 2021-03-11 DIAGNOSIS — E66.01 SEVERE OBESITY (BMI 35.0-39.9) WITH COMORBIDITY (HCC): Chronic | ICD-10-CM

## 2021-03-11 DIAGNOSIS — I25.10 CORONARY ARTERY DISEASE INVOLVING NATIVE CORONARY ARTERY OF NATIVE HEART WITHOUT ANGINA PECTORIS: ICD-10-CM

## 2021-03-11 RX ORDER — SIMVASTATIN 40 MG
TABLET ORAL
Qty: 90 TABLET | Refills: 2 | OUTPATIENT
Start: 2021-03-11

## 2021-03-11 RX ORDER — SIMVASTATIN 40 MG
40 TABLET ORAL NIGHTLY
Qty: 90 TABLET | Refills: 2 | OUTPATIENT
Start: 2021-03-11

## 2021-03-11 NOTE — TELEPHONE ENCOUNTER
Medication(s) to Refill:   Requested Prescriptions     Pending Prescriptions Disp Refills   • simvastatin 40 MG Oral Tab 90 tablet 2     Sig: Take 1 tablet (40 mg total) by mouth nightly.        LOV: 9-2-2020    RTC:  1 year    Last Refill: 2---90 ta

## 2021-03-17 ENCOUNTER — LAB ENCOUNTER (OUTPATIENT)
Dept: LAB | Age: 76
End: 2021-03-17
Attending: INTERNAL MEDICINE
Payer: MEDICARE

## 2021-03-17 PROCEDURE — 36415 COLL VENOUS BLD VENIPUNCTURE: CPT | Performed by: INTERNAL MEDICINE

## 2021-03-17 PROCEDURE — 80048 BASIC METABOLIC PNL TOTAL CA: CPT | Performed by: INTERNAL MEDICINE

## 2021-03-17 PROCEDURE — 85025 COMPLETE CBC W/AUTO DIFF WBC: CPT | Performed by: INTERNAL MEDICINE

## 2021-03-17 PROCEDURE — 84460 ALANINE AMINO (ALT) (SGPT): CPT | Performed by: INTERNAL MEDICINE

## 2021-03-17 PROCEDURE — 82728 ASSAY OF FERRITIN: CPT | Performed by: INTERNAL MEDICINE

## 2021-03-17 PROCEDURE — 84450 TRANSFERASE (AST) (SGOT): CPT | Performed by: INTERNAL MEDICINE

## 2021-05-14 ENCOUNTER — TELEPHONE (OUTPATIENT)
Dept: CASE MANAGEMENT | Age: 76
End: 2021-05-14

## 2021-09-03 ENCOUNTER — OFFICE VISIT (OUTPATIENT)
Dept: INTERNAL MEDICINE CLINIC | Facility: CLINIC | Age: 76
End: 2021-09-03
Payer: COMMERCIAL

## 2021-09-03 VITALS
SYSTOLIC BLOOD PRESSURE: 140 MMHG | BODY MASS INDEX: 34.87 KG/M2 | HEIGHT: 65.5 IN | WEIGHT: 211.81 LBS | HEART RATE: 55 BPM | RESPIRATION RATE: 16 BRPM | DIASTOLIC BLOOD PRESSURE: 80 MMHG | TEMPERATURE: 98 F

## 2021-09-03 DIAGNOSIS — E78.2 MIXED HYPERLIPIDEMIA: ICD-10-CM

## 2021-09-03 DIAGNOSIS — R97.20 ELEVATED PSA: ICD-10-CM

## 2021-09-03 DIAGNOSIS — Z00.00 ENCOUNTER FOR ANNUAL HEALTH EXAMINATION: ICD-10-CM

## 2021-09-03 DIAGNOSIS — E66.01 SEVERE OBESITY (BMI 35.0-39.9) WITH COMORBIDITY (HCC): Chronic | ICD-10-CM

## 2021-09-03 DIAGNOSIS — M46.96 UNSPECIFIED INFLAMMATORY SPONDYLOPATHY, LUMBAR REGION (HCC): ICD-10-CM

## 2021-09-03 DIAGNOSIS — R03.0 ELEVATED BLOOD PRESSURE READING IN OFFICE WITHOUT DIAGNOSIS OF HYPERTENSION: ICD-10-CM

## 2021-09-03 DIAGNOSIS — D50.0 IRON DEFICIENCY ANEMIA DUE TO CHRONIC BLOOD LOSS: ICD-10-CM

## 2021-09-03 DIAGNOSIS — R73.01 IMPAIRED FASTING GLUCOSE: ICD-10-CM

## 2021-09-03 DIAGNOSIS — I25.10 CORONARY ARTERY DISEASE INVOLVING NATIVE CORONARY ARTERY OF NATIVE HEART WITHOUT ANGINA PECTORIS: ICD-10-CM

## 2021-09-03 DIAGNOSIS — Z00.00 ROUTINE GENERAL MEDICAL EXAMINATION AT A HEALTH CARE FACILITY: Primary | ICD-10-CM

## 2021-09-03 PROCEDURE — 3079F DIAST BP 80-89 MM HG: CPT | Performed by: INTERNAL MEDICINE

## 2021-09-03 PROCEDURE — 3077F SYST BP >= 140 MM HG: CPT | Performed by: INTERNAL MEDICINE

## 2021-09-03 PROCEDURE — 96160 PT-FOCUSED HLTH RISK ASSMT: CPT | Performed by: INTERNAL MEDICINE

## 2021-09-03 PROCEDURE — G0439 PPPS, SUBSEQ VISIT: HCPCS | Performed by: INTERNAL MEDICINE

## 2021-09-03 PROCEDURE — 99397 PER PM REEVAL EST PAT 65+ YR: CPT | Performed by: INTERNAL MEDICINE

## 2021-09-03 PROCEDURE — 3008F BODY MASS INDEX DOCD: CPT | Performed by: INTERNAL MEDICINE

## 2021-09-03 RX ORDER — SIMVASTATIN 40 MG
40 TABLET ORAL NIGHTLY
Qty: 90 TABLET | Refills: 0 | Status: SHIPPED | OUTPATIENT
Start: 2021-09-03 | End: 2021-12-01

## 2021-09-03 NOTE — PROGRESS NOTES
Brittani Caballero is a 67year old male. HPI:   Patient presents for medicare supervisit and additional issues noted below. Denies health concerns. He plans to resume seeing Vikram Jacobson.     1. 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 Can continue ASA 81 mg, avoid all other nsaids.   -  normal CBC but low ferritin in 3/2021. Repeat CBC q 6 months. - normal EGD and colonoscopy by Jose Eduardo León in 10/2020. # Presumed onychomycosis: start with ciclopirox.

## 2021-09-03 NOTE — PATIENT INSTRUCTIONS
Please resume your cholesterol medication (simvastatin 40 mg) every day now. Please complete fasting labs 6 weeks after starting the simvastatin. I will send refills of your simvastatin if the labs look good.      Please return to see me a few days after yo

## 2021-10-15 ENCOUNTER — OFFICE VISIT (OUTPATIENT)
Dept: INTERNAL MEDICINE CLINIC | Facility: CLINIC | Age: 76
End: 2021-10-15
Payer: COMMERCIAL

## 2021-10-15 ENCOUNTER — LAB ENCOUNTER (OUTPATIENT)
Dept: LAB | Age: 76
End: 2021-10-15
Attending: INTERNAL MEDICINE
Payer: MEDICARE

## 2021-10-15 VITALS
DIASTOLIC BLOOD PRESSURE: 60 MMHG | BODY MASS INDEX: 34.21 KG/M2 | HEIGHT: 65.5 IN | TEMPERATURE: 98 F | OXYGEN SATURATION: 98 % | RESPIRATION RATE: 16 BRPM | SYSTOLIC BLOOD PRESSURE: 102 MMHG | WEIGHT: 207.81 LBS | HEART RATE: 78 BPM

## 2021-10-15 DIAGNOSIS — R30.0 DYSURIA: ICD-10-CM

## 2021-10-15 DIAGNOSIS — N40.0 BENIGN PROSTATIC HYPERPLASIA WITHOUT LOWER URINARY TRACT SYMPTOMS: ICD-10-CM

## 2021-10-15 DIAGNOSIS — E78.2 MIXED HYPERLIPIDEMIA: ICD-10-CM

## 2021-10-15 DIAGNOSIS — D50.0 IRON DEFICIENCY ANEMIA DUE TO CHRONIC BLOOD LOSS: ICD-10-CM

## 2021-10-15 DIAGNOSIS — R97.20 PSA ELEVATION: Primary | ICD-10-CM

## 2021-10-15 PROCEDURE — 3078F DIAST BP <80 MM HG: CPT | Performed by: INTERNAL MEDICINE

## 2021-10-15 PROCEDURE — 90662 IIV NO PRSV INCREASED AG IM: CPT | Performed by: INTERNAL MEDICINE

## 2021-10-15 PROCEDURE — 3074F SYST BP LT 130 MM HG: CPT | Performed by: INTERNAL MEDICINE

## 2021-10-15 PROCEDURE — 3008F BODY MASS INDEX DOCD: CPT | Performed by: INTERNAL MEDICINE

## 2021-10-15 PROCEDURE — 99213 OFFICE O/P EST LOW 20 MIN: CPT | Performed by: INTERNAL MEDICINE

## 2021-10-15 PROCEDURE — G0008 ADMIN INFLUENZA VIRUS VAC: HCPCS | Performed by: INTERNAL MEDICINE

## 2021-10-15 PROCEDURE — 81003 URINALYSIS AUTO W/O SCOPE: CPT | Performed by: INTERNAL MEDICINE

## 2021-10-15 RX ORDER — MELATONIN
325
COMMUNITY

## 2021-10-15 NOTE — PATIENT INSTRUCTIONS
Please take over the counter ferrous sulfate (iron) every day. It can cause constipation so you can use stool softners. However, if the constipation is very severe, you can try taking iron 3 times per week instead. Please repeat your labs in 3 months.  You

## 2021-10-15 NOTE — PROGRESS NOTES
Marine Peck is a 67year old male. HPI:   Patient presents for the following issues -  1. Elevated blood pressure: does not check at home. States he was stressed at last office visit but has been decreasing his work load and reducing stress levels. onychomycosis   • Diverticulosis    • Glucose intolerance (pre-diabetes)    • Heart attack ([facility] [address] 2001   • High cholesterol    • Other and unspecified hyperlipidemia    • Unspecified essential hypertension    • Visual impairment     glasses      Past Surgic onychomycosis: start with ciclopirox. Has been on lamisil in past.    # Morbid Obesity, BMI 34 with co-morbidities: Counseled again on healthy plant based nutrition, regular exercise, and practicing mindfulness. We outlined small, achievable goals.    # Imp

## 2021-11-30 DIAGNOSIS — R73.01 IMPAIRED FASTING GLUCOSE: ICD-10-CM

## 2021-11-30 DIAGNOSIS — R97.20 ELEVATED PSA: ICD-10-CM

## 2021-11-30 DIAGNOSIS — I25.10 CORONARY ARTERY DISEASE INVOLVING NATIVE CORONARY ARTERY OF NATIVE HEART WITHOUT ANGINA PECTORIS: ICD-10-CM

## 2021-11-30 DIAGNOSIS — E78.2 MIXED HYPERLIPIDEMIA: ICD-10-CM

## 2021-11-30 DIAGNOSIS — E66.01 SEVERE OBESITY (BMI 35.0-39.9) WITH COMORBIDITY (HCC): Chronic | ICD-10-CM

## 2021-11-30 DIAGNOSIS — Z00.00 ENCOUNTER FOR ANNUAL HEALTH EXAMINATION: ICD-10-CM

## 2021-12-01 RX ORDER — SIMVASTATIN 40 MG
40 TABLET ORAL NIGHTLY
Qty: 90 TABLET | Refills: 3 | Status: SHIPPED | OUTPATIENT
Start: 2021-12-01 | End: 2023-03-27

## 2022-02-22 ENCOUNTER — TELEPHONE (OUTPATIENT)
Dept: INTERNAL MEDICINE CLINIC | Facility: CLINIC | Age: 77
End: 2022-02-22

## 2022-07-29 ENCOUNTER — OFFICE VISIT (OUTPATIENT)
Dept: INTERNAL MEDICINE CLINIC | Facility: CLINIC | Age: 77
End: 2022-07-29
Payer: COMMERCIAL

## 2022-07-29 ENCOUNTER — TELEPHONE (OUTPATIENT)
Dept: INTERNAL MEDICINE CLINIC | Facility: CLINIC | Age: 77
End: 2022-07-29

## 2022-07-29 VITALS
BODY MASS INDEX: 35.46 KG/M2 | OXYGEN SATURATION: 98 % | DIASTOLIC BLOOD PRESSURE: 70 MMHG | SYSTOLIC BLOOD PRESSURE: 115 MMHG | WEIGHT: 212.81 LBS | HEART RATE: 90 BPM | RESPIRATION RATE: 17 BRPM | TEMPERATURE: 99 F | HEIGHT: 65 IN

## 2022-07-29 DIAGNOSIS — N40.0 BENIGN PROSTATIC HYPERPLASIA WITHOUT LOWER URINARY TRACT SYMPTOMS: ICD-10-CM

## 2022-07-29 DIAGNOSIS — E78.2 MIXED HYPERLIPIDEMIA: ICD-10-CM

## 2022-07-29 DIAGNOSIS — R73.01 IMPAIRED FASTING GLUCOSE: ICD-10-CM

## 2022-07-29 DIAGNOSIS — E66.01 SEVERE OBESITY (BMI 35.0-39.9) WITH COMORBIDITY (HCC): ICD-10-CM

## 2022-07-29 DIAGNOSIS — Z00.00 ROUTINE GENERAL MEDICAL EXAMINATION AT A HEALTH CARE FACILITY: Primary | ICD-10-CM

## 2022-07-29 DIAGNOSIS — M46.96 UNSPECIFIED INFLAMMATORY SPONDYLOPATHY, LUMBAR REGION (HCC): ICD-10-CM

## 2022-07-29 DIAGNOSIS — R97.20 ELEVATED PSA: ICD-10-CM

## 2022-07-29 DIAGNOSIS — D50.0 IRON DEFICIENCY ANEMIA DUE TO CHRONIC BLOOD LOSS: ICD-10-CM

## 2022-07-29 DIAGNOSIS — E66.01 MORBIDLY OBESE (HCC): ICD-10-CM

## 2022-07-29 DIAGNOSIS — I25.10 CORONARY ARTERY DISEASE INVOLVING NATIVE CORONARY ARTERY OF NATIVE HEART WITHOUT ANGINA PECTORIS: ICD-10-CM

## 2022-07-29 PROBLEM — R21 RASH AND NONSPECIFIC SKIN ERUPTION: Status: RESOLVED | Noted: 2019-10-09 | Resolved: 2022-07-29

## 2022-07-29 PROBLEM — R03.0 ELEVATED BLOOD PRESSURE READING IN OFFICE WITHOUT DIAGNOSIS OF HYPERTENSION: Status: RESOLVED | Noted: 2021-09-03 | Resolved: 2022-07-29

## 2022-07-29 PROCEDURE — 3074F SYST BP LT 130 MM HG: CPT | Performed by: INTERNAL MEDICINE

## 2022-07-29 PROCEDURE — 96160 PT-FOCUSED HLTH RISK ASSMT: CPT | Performed by: INTERNAL MEDICINE

## 2022-07-29 PROCEDURE — G0439 PPPS, SUBSEQ VISIT: HCPCS | Performed by: INTERNAL MEDICINE

## 2022-07-29 PROCEDURE — 99397 PER PM REEVAL EST PAT 65+ YR: CPT | Performed by: INTERNAL MEDICINE

## 2022-07-29 PROCEDURE — 3078F DIAST BP <80 MM HG: CPT | Performed by: INTERNAL MEDICINE

## 2022-07-29 PROCEDURE — 1126F AMNT PAIN NOTED NONE PRSNT: CPT | Performed by: INTERNAL MEDICINE

## 2022-07-29 PROCEDURE — 3008F BODY MASS INDEX DOCD: CPT | Performed by: INTERNAL MEDICINE

## 2022-07-29 NOTE — TELEPHONE ENCOUNTER
Faxed completed medical records form to [address] [facility] [address] at [phone]    Received confirmation.

## 2022-08-06 LAB
ABSOLUTE BASOPHILS: 42 CELLS/UL (ref 0–200)
ABSOLUTE EOSINOPHILS: 99 CELLS/UL (ref 15–500)
ABSOLUTE LYMPHOCYTES: 1866 CELLS/UL (ref 850–3900)
ABSOLUTE MONOCYTES: 432 CELLS/UL (ref 200–950)
ABSOLUTE NEUTROPHILS: 2261 CELLS/UL (ref 1500–7800)
ALT: 15 U/L (ref 9–46)
AST: 28 U/L (ref 10–35)
BASOPHILS: 0.9 %
BUN: 18 MG/DL (ref 7–25)
CALCIUM: 9.1 MG/DL (ref 8.6–10.3)
CARBON DIOXIDE: 27 MMOL/L (ref 20–32)
CHLORIDE: 106 MMOL/L (ref 98–110)
CREATININE: 0.96 MG/DL (ref 0.7–1.28)
EGFR: 81 ML/MIN/1.73M2
EOSINOPHILS: 2.1 %
FERRITIN: 8 NG/ML (ref 24–380)
FOLATE, SERUM: 10.8 NG/ML
GLUCOSE: 99 MG/DL (ref 65–99)
HEMATOCRIT: 41.3 % (ref 38.5–50)
HEMOGLOBIN A1C: 5.7 % OF TOTAL HGB
HEMOGLOBIN: 13.2 G/DL (ref 13.2–17.1)
LYMPHOCYTES: 39.7 %
MCH: 28.4 PG (ref 27–33)
MCHC: 32 G/DL (ref 32–36)
MCV: 89 FL (ref 80–100)
MONOCYTES: 9.2 %
MPV: 9.6 FL (ref 7.5–12.5)
NEUTROPHILS: 48.1 %
PLATELET COUNT: 217 THOUSAND/UL (ref 140–400)
POTASSIUM: 4.6 MMOL/L (ref 3.5–5.3)
PSA, TOTAL: 4.51 NG/ML
RDW: 15.4 % (ref 11–15)
RED BLOOD CELL COUNT: 4.64 MILLION/UL (ref 4.2–5.8)
SODIUM: 140 MMOL/L (ref 135–146)
VITAMIN B12: 382 PG/ML (ref 200–1100)
WHITE BLOOD CELL COUNT: 4.7 THOUSAND/UL (ref 3.8–10.8)

## 2022-08-26 ENCOUNTER — OFFICE VISIT (OUTPATIENT)
Dept: SURGERY | Facility: CLINIC | Age: 77
End: 2022-08-26
Payer: COMMERCIAL

## 2022-08-26 DIAGNOSIS — N13.8 BPH WITH OBSTRUCTION/LOWER URINARY TRACT SYMPTOMS: ICD-10-CM

## 2022-08-26 DIAGNOSIS — R82.90 URINE FINDING: ICD-10-CM

## 2022-08-26 DIAGNOSIS — N40.1 BPH WITH OBSTRUCTION/LOWER URINARY TRACT SYMPTOMS: ICD-10-CM

## 2022-08-26 DIAGNOSIS — R97.20 ELEVATED PSA: Primary | ICD-10-CM

## 2022-08-26 DIAGNOSIS — R33.9 INCOMPLETE EMPTYING OF BLADDER: ICD-10-CM

## 2022-08-26 LAB
APPEARANCE: CLEAR
BILIRUBIN: NEGATIVE
GLUCOSE (URINE DIPSTICK): NEGATIVE MG/DL
KETONES (URINE DIPSTICK): NEGATIVE MG/DL
LEUKOCYTES: NEGATIVE
MULTISTIX LOT#: NORMAL NUMERIC
NITRITE, URINE: NEGATIVE
OCCULT BLOOD: NEGATIVE
PH, URINE: 5.5 (ref 4.5–8)
PROTEIN (URINE DIPSTICK): NEGATIVE MG/DL
SPECIFIC GRAVITY: <=1.005 (ref 1–1.03)
UROBILINOGEN,SEMI-QN: 0.2 MG/DL (ref 0–1.9)

## 2022-08-26 PROCEDURE — 51798 US URINE CAPACITY MEASURE: CPT | Performed by: PHYSICIAN ASSISTANT

## 2022-08-26 PROCEDURE — 99203 OFFICE O/P NEW LOW 30 MIN: CPT | Performed by: PHYSICIAN ASSISTANT

## 2022-08-26 PROCEDURE — 81003 URINALYSIS AUTO W/O SCOPE: CPT | Performed by: PHYSICIAN ASSISTANT

## 2022-08-26 RX ORDER — TAMSULOSIN HYDROCHLORIDE 0.4 MG/1
0.4 CAPSULE ORAL DAILY
Qty: 90 CAPSULE | Refills: 3 | Status: SHIPPED | OUTPATIENT
Start: 2022-08-26

## 2022-12-01 ENCOUNTER — OFFICE VISIT (OUTPATIENT)
Dept: SURGERY | Facility: CLINIC | Age: 77
End: 2022-12-01
Payer: COMMERCIAL

## 2022-12-01 DIAGNOSIS — R97.20 ELEVATED PSA: ICD-10-CM

## 2022-12-01 DIAGNOSIS — R33.9 INCOMPLETE EMPTYING OF BLADDER: ICD-10-CM

## 2022-12-01 DIAGNOSIS — N13.8 BPH WITH OBSTRUCTION/LOWER URINARY TRACT SYMPTOMS: Primary | ICD-10-CM

## 2022-12-01 DIAGNOSIS — R82.90 URINE FINDING: ICD-10-CM

## 2022-12-01 DIAGNOSIS — N40.1 BPH WITH OBSTRUCTION/LOWER URINARY TRACT SYMPTOMS: Primary | ICD-10-CM

## 2022-12-01 LAB
APPEARANCE: CLEAR
BILIRUBIN: NEGATIVE
GLUCOSE (URINE DIPSTICK): NEGATIVE MG/DL
KETONES (URINE DIPSTICK): NEGATIVE MG/DL
LEUKOCYTES: NEGATIVE
MULTISTIX LOT#: NORMAL NUMERIC
NITRITE, URINE: NEGATIVE
OCCULT BLOOD: NEGATIVE
PH, URINE: 6 (ref 4.5–8)
PROTEIN (URINE DIPSTICK): NEGATIVE MG/DL
SPECIFIC GRAVITY: >=1.03 (ref 1–1.03)
URINE-COLOR: YELLOW
UROBILINOGEN,SEMI-QN: 0.2 MG/DL (ref 0–1.9)

## 2022-12-01 PROCEDURE — 51798 US URINE CAPACITY MEASURE: CPT | Performed by: PHYSICIAN ASSISTANT

## 2022-12-01 PROCEDURE — 81003 URINALYSIS AUTO W/O SCOPE: CPT | Performed by: PHYSICIAN ASSISTANT

## 2022-12-01 PROCEDURE — 99213 OFFICE O/P EST LOW 20 MIN: CPT | Performed by: PHYSICIAN ASSISTANT

## 2023-03-27 DIAGNOSIS — R73.01 IMPAIRED FASTING GLUCOSE: ICD-10-CM

## 2023-03-27 DIAGNOSIS — E78.2 MIXED HYPERLIPIDEMIA: ICD-10-CM

## 2023-03-27 DIAGNOSIS — R97.20 ELEVATED PSA: ICD-10-CM

## 2023-03-27 DIAGNOSIS — E66.01 SEVERE OBESITY (BMI 35.0-39.9) WITH COMORBIDITY (HCC): Chronic | ICD-10-CM

## 2023-03-27 DIAGNOSIS — I25.10 CORONARY ARTERY DISEASE INVOLVING NATIVE CORONARY ARTERY OF NATIVE HEART WITHOUT ANGINA PECTORIS: ICD-10-CM

## 2023-03-27 DIAGNOSIS — Z00.00 ENCOUNTER FOR ANNUAL HEALTH EXAMINATION: ICD-10-CM

## 2023-03-27 RX ORDER — SIMVASTATIN 40 MG
40 TABLET ORAL NIGHTLY
Qty: 90 TABLET | Refills: 0 | Status: SHIPPED | OUTPATIENT
Start: 2023-03-27

## 2023-07-13 DIAGNOSIS — E66.01 SEVERE OBESITY (BMI 35.0-39.9) WITH COMORBIDITY (HCC): Chronic | ICD-10-CM

## 2023-07-13 DIAGNOSIS — R97.20 ELEVATED PSA: ICD-10-CM

## 2023-07-13 DIAGNOSIS — E78.2 MIXED HYPERLIPIDEMIA: ICD-10-CM

## 2023-07-13 DIAGNOSIS — R73.01 IMPAIRED FASTING GLUCOSE: ICD-10-CM

## 2023-07-13 DIAGNOSIS — Z00.00 ENCOUNTER FOR ANNUAL HEALTH EXAMINATION: ICD-10-CM

## 2023-07-13 DIAGNOSIS — I25.10 CORONARY ARTERY DISEASE INVOLVING NATIVE CORONARY ARTERY OF NATIVE HEART WITHOUT ANGINA PECTORIS: ICD-10-CM

## 2023-07-13 RX ORDER — SIMVASTATIN 40 MG
40 TABLET ORAL NIGHTLY
Qty: 90 TABLET | Refills: 0 | OUTPATIENT
Start: 2023-07-13

## 2023-07-13 NOTE — TELEPHONE ENCOUNTER
simvastatin 40 MG Oral Tab         Sig: Take 1 tablet (40 mg total) by mouth nightly. Disp: 90 tablet    Refills: 0    Start: 7/13/2023    Class: Normal    Non-formulary For: Mixed hyperlipidemia, Impaired fasting glucose, Elevated PSA, Encounter for annual health examination, Severe obesity (BMI 35.0-39. 9) with comorbidity ([address] Coronary artery disease involving native coronary artery of native heart without angina pectoris    Last ordered: 3 months ago by Lesley Swartz MD     Cholesterol Medication Protocol [id number]/2023 08:50 AM   Protocol Details Lipid panel within past 12 months    ALT < 80    ALT resulted within past year    Appointment within past 12 or next 3 months      LOV 7/29/22  RTC 1 year  Filled 3/27/23 90 tabs 0 refills   Last labs 8/5/22  No future appointments.

## 2023-07-21 LAB
ALT: 15 U/L (ref 9–46)
AST: 24 U/L (ref 10–35)
BUN: 17 MG/DL (ref 7–25)
CALCIUM: 9 MG/DL (ref 8.6–10.3)
CARBON DIOXIDE: 25 MMOL/L (ref 20–32)
CHLORIDE: 106 MMOL/L (ref 98–110)
CHOL/HDLC RATIO: 2.5 (CALC)
CHOLESTEROL, TOTAL: 184 MG/DL
CREATININE: 0.99 MG/DL (ref 0.7–1.28)
EGFR: 78 ML/MIN/1.73M2
GLUCOSE: 93 MG/DL (ref 65–99)
HDL CHOLESTEROL: 75 MG/DL
LDL-CHOLESTEROL: 90 MG/DL (CALC)
NON-HDL CHOLESTEROL: 109 MG/DL (CALC)
POTASSIUM: 4.5 MMOL/L (ref 3.5–5.3)
PSA, TOTAL: 4.79 NG/ML
SODIUM: 139 MMOL/L (ref 135–146)
TRIGLYCERIDES: 98 MG/DL

## 2023-09-22 DIAGNOSIS — I25.10 CORONARY ARTERY DISEASE INVOLVING NATIVE CORONARY ARTERY OF NATIVE HEART WITHOUT ANGINA PECTORIS: ICD-10-CM

## 2023-09-22 DIAGNOSIS — E66.01 SEVERE OBESITY (BMI 35.0-39.9) WITH COMORBIDITY (HCC): Chronic | ICD-10-CM

## 2023-09-22 DIAGNOSIS — E78.2 MIXED HYPERLIPIDEMIA: ICD-10-CM

## 2023-09-22 DIAGNOSIS — Z00.00 ENCOUNTER FOR ANNUAL HEALTH EXAMINATION: ICD-10-CM

## 2023-09-22 DIAGNOSIS — R73.01 IMPAIRED FASTING GLUCOSE: ICD-10-CM

## 2023-09-22 DIAGNOSIS — R97.20 ELEVATED PSA: ICD-10-CM

## 2023-09-22 NOTE — TELEPHONE ENCOUNTER
Future Appointments   Date Time Provider Vikram Owens   12/22/2023  2:00 PM Liana Vasquez MD [facility] [place]     Patient states he needs refill on   simvastatin 40 MG Oral Tab    [id number] - [address] - [address] AT [address], [phone], [phone]    **Earliest available for MAS is in December.

## 2023-09-25 RX ORDER — SIMVASTATIN 40 MG
40 TABLET ORAL NIGHTLY
Qty: 90 TABLET | Refills: 0 | Status: SHIPPED | OUTPATIENT
Start: 2023-09-25

## 2023-09-25 NOTE — TELEPHONE ENCOUNTER
LOV: 7/29/2022 with Dr. Bernard Carvajal  RTC: 1 year  Last Relevant Labs: 7/20/2023  Filled: 3/27/2023   #90 with 0 refills    Future Appointments   Date Time Provider Vikram Owens   12/22/2023  2:00 PM Kristine Wells MD [facility] [place]     Please review Raisa's message below.

## 2023-11-17 RX ORDER — TAMSULOSIN HYDROCHLORIDE 0.4 MG/1
0.4 CAPSULE ORAL
Qty: 90 CAPSULE | Refills: 0 | Status: SHIPPED | OUTPATIENT
Start: 2023-11-17

## 2024-01-04 ENCOUNTER — OFFICE VISIT (OUTPATIENT)
Dept: SURGERY | Facility: CLINIC | Age: 79
End: 2024-01-04

## 2024-01-04 DIAGNOSIS — R39.15 URINARY URGENCY: ICD-10-CM

## 2024-01-04 DIAGNOSIS — N13.8 BPH WITH OBSTRUCTION/LOWER URINARY TRACT SYMPTOMS: Primary | ICD-10-CM

## 2024-01-04 DIAGNOSIS — R82.90 URINE FINDING: ICD-10-CM

## 2024-01-04 DIAGNOSIS — N40.1 BPH WITH OBSTRUCTION/LOWER URINARY TRACT SYMPTOMS: Primary | ICD-10-CM

## 2024-01-04 DIAGNOSIS — R97.20 ELEVATED PSA: ICD-10-CM

## 2024-01-04 LAB
BILIRUBIN: NEGATIVE
GLUCOSE (URINE DIPSTICK): NEGATIVE MG/DL
KETONES (URINE DIPSTICK): NEGATIVE MG/DL
LEUKOCYTES: NEGATIVE
MULTISTIX LOT#: NORMAL NUMERIC
NITRITE, URINE: NEGATIVE
OCCULT BLOOD: NEGATIVE
PH, URINE: 5.5 (ref 4.5–8)
PROTEIN (URINE DIPSTICK): NEGATIVE MG/DL
SPECIFIC GRAVITY: 1.02 (ref 1–1.03)
URINE-COLOR: YELLOW
UROBILINOGEN,SEMI-QN: 0.2 MG/DL (ref 0–1.9)

## 2024-01-04 PROCEDURE — 1160F RVW MEDS BY RX/DR IN RCRD: CPT | Performed by: PHYSICIAN ASSISTANT

## 2024-01-04 PROCEDURE — 99213 OFFICE O/P EST LOW 20 MIN: CPT | Performed by: PHYSICIAN ASSISTANT

## 2024-01-04 PROCEDURE — 81003 URINALYSIS AUTO W/O SCOPE: CPT | Performed by: PHYSICIAN ASSISTANT

## 2024-01-04 PROCEDURE — 1159F MED LIST DOCD IN RCRD: CPT | Performed by: PHYSICIAN ASSISTANT

## 2024-01-04 RX ORDER — TAMSULOSIN HYDROCHLORIDE 0.4 MG/1
0.4 CAPSULE ORAL DAILY
Qty: 90 CAPSULE | Refills: 3 | Status: SHIPPED | OUTPATIENT
Start: 2024-01-04 | End: 2024-12-29

## 2024-01-04 NOTE — PROGRESS NOTES
Subjective:   Patient is a 78 year old male with hx of CAD, HTN, HL, nephrolithiasis, and BPH with LUTS who presents today for annual follow up.    Seen approximately 1 year ago for follow up elevated PSA and BPH. Had been seen prior to that visit for follow up and residual was elevated 130mL. Started on tamsulosin with notable improvement and residual minimal at last check. He remains on tamsulosin 0.4mg daily and tolerating without ASE.      Prior PSAs labile in the range of 4.5-6.88 since 2016. Patient had been seen previously for elevated PSA and had MRI 2017 completed that showed PIRADS2 grading and estimated volume 42g. We discussed consideration that no additional PSA would need to be checked, but he would like to monitor. Most recent PSA 4.79.    Daytime voiding unchanged q 2-3 hours, but nocturia x 1 and urine stream improved. He also has noticed improvement in his urinary urgency and no additional UUI, which he was previously experiencing approximately 1/week. He denies any dysuria or gross hematuria.      UA today negative  Scr 0.99  Remote history of stones, none recently. Last imaging 2013 that was negative stones.      PSA 7/20/23 4.79  PSA 8/5/22 4.51  Lab Results   Component Value Date/Time    PSA 6.88 (H) 10/15/2021 07:54 AM    PSA 4.84 (H) 09/11/2020 07:19 AM    PSA 4.77 (H) 03/18/2019 12:38 PM    PSA 5.81 (H) 12/20/2017 09:15 AM    PSA 5.990 (H) 05/23/2017 02:26 PM    PSA 4.550 (H) 08/03/2016 09:42 AM    PSA 5.440 (H) 02/03/2016 10:32 AM    PSA 2.91 01/18/2011 10:16 AM     Lab Results   Component Value Date    PSAS 4.31 (H) 05/20/2015     History/Other:    Chief Complaint Reviewed and Verified  Nursing Notes Reviewed and   Verified  Allergies Reviewed  Medications Reviewed         Current Outpatient Medications   Medication Sig Dispense Refill    tamsulosin 0.4 MG Oral Cap TAKE 1 CAPSULE(0.4 MG) BY MOUTH DAILY 30 MINUTES AFTER THE SAME MEAL 90 capsule 0    simvastatin 40 MG Oral Tab Take 1  tablet (40 mg total) by mouth nightly. 90 tablet 0    Ciclopirox 8 % External Solution Apply 1 Application topically nightly. 12 mL 3    aspirin 81 MG Oral Tab Take 1 tablet (81 mg total) by mouth daily.      ferrous sulfate 325 (65 FE) MG Oral Tab EC Take 325 mg by mouth daily with breakfast. (Patient not taking: Reported on 1/4/2024)         Review of Systems:  Pertinent items are noted in HPI.      Objective:   There were no vitals taken for this visit. Estimated body mass index is 35.41 kg/m² as calculated from the following:    Height as of 7/29/22: 5' 5\" (1.651 m).    Weight as of 7/29/22: 212 lb 12.8 oz (96.5 kg).    GENERAL APPEARANCE: a well-developed, well-nourished, in no apparent distress  A&Ox3  ABDOMEN: soft, good BS's, no masses/HSM, no tenderness  CVA: no CVA tenderness  INGUINAL CANALS: no hernias  PENILE MEATUS: open and in normal location  PENIS normal  SCROTUM: normal  no varicocele  TESTES: normal anatomy  EPIDIDYMIS: normal anatomy  SKIN without lesion  EMILIE 50g without nodule or induration    Laboratory Data:  Lab Results   Component Value Date    WBC 4.7 08/05/2022    HGB 13.2 08/05/2022     08/05/2022     Lab Results   Component Value Date     07/20/2023    K 4.5 07/20/2023     07/20/2023    CO2 25 07/20/2023    BUN 17 07/20/2023    GLU 93 07/20/2023    GFRAA 86 10/15/2021    AST 24 07/20/2023    ALT 15 07/20/2023    TP 7.3 05/20/2015    ALB 3.6 05/20/2015    CA 9.0 07/20/2023       Urinalysis Results (last three years):  Recent Labs     10/15/21  1501 08/26/22  0957 12/01/22  1632 01/04/24  0920   SPECGRAVITY 1.015 <=1.005 >=1.030 1.025   PHURINE 7.5 5.5 6.0 5.5   NITRITE Negative Negative Negative Negative       Urine Culture Results (last three years):  No results found for: \"URINECUL\"    Imaging  No results found.    Assessment & Plan:   BPH with incomplete emptying  Urinary urge incontinence  Improved subjectively and residual improved on alpha blocker  Patient  tolerating well  Option of finasteride discussed, declined at this time.  UA negative, residual at last check minimal  Recommend continuation of Tamsulosin 0.4mg nightly.     Elevated PSA  Labile and within range for age  No prior prostate biopsy  MRI 2017 PI RADS 2  Patient wishes to check again in 1 year.      Modesta Gan PA-C, 1/4/2024

## 2024-03-04 ENCOUNTER — OFFICE VISIT (OUTPATIENT)
Dept: INTERNAL MEDICINE CLINIC | Facility: CLINIC | Age: 79
End: 2024-03-04
Payer: COMMERCIAL

## 2024-03-04 VITALS
HEART RATE: 86 BPM | DIASTOLIC BLOOD PRESSURE: 82 MMHG | WEIGHT: 192.38 LBS | RESPIRATION RATE: 16 BRPM | HEIGHT: 65 IN | SYSTOLIC BLOOD PRESSURE: 130 MMHG | BODY MASS INDEX: 32.05 KG/M2 | TEMPERATURE: 98 F | OXYGEN SATURATION: 95 %

## 2024-03-04 DIAGNOSIS — N40.0 BENIGN PROSTATIC HYPERPLASIA WITHOUT LOWER URINARY TRACT SYMPTOMS: ICD-10-CM

## 2024-03-04 DIAGNOSIS — I25.10 CORONARY ARTERY DISEASE INVOLVING NATIVE CORONARY ARTERY OF NATIVE HEART WITHOUT ANGINA PECTORIS: ICD-10-CM

## 2024-03-04 DIAGNOSIS — Z00.00 ENCOUNTER FOR ANNUAL HEALTH EXAMINATION: ICD-10-CM

## 2024-03-04 DIAGNOSIS — R97.20 ELEVATED PSA: ICD-10-CM

## 2024-03-04 DIAGNOSIS — E61.1 IRON DEFICIENCY: ICD-10-CM

## 2024-03-04 DIAGNOSIS — Z00.00 ROUTINE GENERAL MEDICAL EXAMINATION AT A HEALTH CARE FACILITY: Primary | ICD-10-CM

## 2024-03-04 DIAGNOSIS — E66.09 CLASS 1 OBESITY DUE TO EXCESS CALORIES WITH SERIOUS COMORBIDITY AND BODY MASS INDEX (BMI) OF 32.0 TO 32.9 IN ADULT: ICD-10-CM

## 2024-03-04 DIAGNOSIS — M46.96 UNSPECIFIED INFLAMMATORY SPONDYLOPATHY, LUMBAR REGION (HCC): ICD-10-CM

## 2024-03-04 DIAGNOSIS — R73.01 IMPAIRED FASTING GLUCOSE: ICD-10-CM

## 2024-03-04 DIAGNOSIS — E78.2 MIXED HYPERLIPIDEMIA: ICD-10-CM

## 2024-03-04 PROBLEM — M47.816 FACET ARTHRITIS OF LUMBAR REGION: Status: RESOLVED | Noted: 2017-05-23 | Resolved: 2024-03-04

## 2024-03-04 PROBLEM — E66.01 MORBIDLY OBESE (HCC): Status: RESOLVED | Noted: 2018-01-17 | Resolved: 2024-03-04

## 2024-03-04 PROBLEM — D50.0 IRON DEFICIENCY ANEMIA DUE TO CHRONIC BLOOD LOSS: Status: RESOLVED | Noted: 2021-09-03 | Resolved: 2024-03-04

## 2024-03-04 RX ORDER — SIMVASTATIN 40 MG
40 TABLET ORAL NIGHTLY
Qty: 90 TABLET | Refills: 0 | Status: SHIPPED | OUTPATIENT
Start: 2024-03-04

## 2024-03-04 RX ORDER — LATANOPROST 50 UG/ML
1 SOLUTION/ DROPS OPHTHALMIC NIGHTLY
COMMUNITY

## 2024-03-04 NOTE — PROGRESS NOTES
Dameon El is a 78 year old male.     HPI:   Patient presents for MAS and additional issues noted below.  HLP, CADz - needs refill on statin therapy   Elevated PSA and BPH - doing well on flomax. Nocturia 1-2 times/night. Denies incomplete bladder emptying. Stream is not weak.   Impaired fasting glucose and Obese - due for A1C. He has lost 20 lbs since meeting a  and cutting down on alcohol consumption and eating better.   Iron Deficiency - due for labs. No bleeding. He is donating blood only 2 times/year now. He is no longer on iron supplement.   Quality of life - he finds meaning in his life. He is now sexually active with his new . He feels he has good support in his life. He lives with his daughter and AIXA.   His girlfriend noticed a bruise on left side of stomach 2 days ago. No pain. He does not recall any injury. No falls.   Sexually active - he is not concerned for STDs at this time.         REVIEW OF SYSTEMS:   GENERAL HEALTH: feels well otherwise. No f/c  NEURO: denies any headaches, LH, dizzyness, LOC, falls  VISION: denies any blurred or double vision  RESPIRATORY: denies shortness of breath, cough, or congestion  CARDIOVASCULAR: denies chest pain, pressure or palpitations  GI: denies abdominal pain, constipation, diarrhea, n/v, BRBPR, melena  : no dysuria or hematuria  SKIN: denies any unusual skin lesions or rashes  PSYCH: mood is stable. Denies anxiety and depression.   EXT: denies edema       Wt Readings from Last 6 Encounters:   07/29/22 212 lb 12.8 oz (96.5 kg)   10/15/21 207 lb 12.8 oz (94.3 kg)   09/03/21 211 lb 12.8 oz (96.1 kg)   04/23/21 212 lb (96.2 kg)   10/20/20 211 lb (95.7 kg)   09/22/20 211 lb (95.7 kg)       Allergies   Allergen Reactions    Lipitor [Atorvastatin Calcium] MYALGIA     TABS    Pcn [Penicillins] HIVES and RASH       Family History   Problem Relation Age of Onset    Diabetes Mother     Cancer Neg       Past Medical History:   Diagnosis Date    Agent  orange exposure 2/23/2016    BPH (benign prostatic hyperplasia)     Calculus of kidney     Cataract     CORONARY ARTERY DISEASE 2005    MYOCARDIAL INFARCTION w/o intervention    Coronary atherosclerosis     Dermatophytosis of nail     onychomycosis    Diverticulosis     Glucose intolerance (pre-diabetes)     Heart attack (HCC) 2001    High cholesterol     Other and unspecified hyperlipidemia     Unspecified essential hypertension     Visual impairment     glasses      Past Surgical History:   Procedure Laterality Date    APPENDECTOMY  as a child    COLONOSCOPY  05/08/2007    NL    COLONOSCOPY N/A 5/13/2016    Procedure: COLONOSCOPY;  Surgeon: David Coyle MD;  Location:  ENDOSCOPY    COLONOSCOPY N/A 10/20/2020    Procedure: COLONOSCOPY;  Surgeon: Fracisco Berkowitz MD;  Location:  ENDOSCOPY    OTHER SURGICAL HISTORY  1960    PILONIDAL CYST      Social History:    Social History     Socioeconomic History    Marital status: Single   Tobacco Use    Smoking status: Never    Smokeless tobacco: Never   Vaping Use    Vaping Use: Never used   Substance and Sexual Activity    Alcohol use: Yes     Alcohol/week: 0.0 standard drinks of alcohol     Comment: 10-12 drinks per week    Drug use: No   Other Topics Concern    Caffeine Concern Yes     Comment: 2 cups of coffee daily.     Exercise Yes     Comment: 2x/week.             EXAM:   /82 (BP Location: Left arm, Patient Position: Sitting, Cuff Size: adult)   Pulse 86   Temp 97.8 °F (36.6 °C) (Temporal)   Resp 16   Ht 5' 5\" (1.651 m)   Wt 192 lb 6.4 oz (87.3 kg)   SpO2 95%   BMI 32.02 kg/m²   GENERAL: A&O well developed, well nourished,in no apparent distress  SKIN: no rashes,no suspicious lesions  HEENT: atraumatic, MMM, throat is clear  NECK: supple, no jvd, no thyromegaly  LUNGS: clear to auscultation bilateraly, no c/w/r  CARDIO: RRR without g/m/r  GI: soft non tender nondistended no hsm bs throughout  NEURO: CN 2-12 grossly intact  PSYCH:  pleasant  EXTREMITIES: no cyanosis, clubbing or edema      ASSESSMENT AND PLAN:   # BPH and Elevated PSA: symptoms are well controlled on flomax. He is following with urology. PSA was stable in 7/2023 and he wants to repeat in 1 year.   # Iron Deficiency Anemia: improved off full strenght ASA and now only donating blood a few times/year. Check CBC now.   - normal EGD and colonoscopy by Hayes Berkowitz in 10/2020.   # Obesity, BMI 32 and impaired fasting glucose: applauded on weight loss.  Counseled again on healthy plant based nutrition, regular exercise, and practicing mindfulness. We outlined small, achievable goals.   # CADz: states he had an MI in 2005 but cadiac cath showed mild disease so no stent was placed. Normal stress test in 5/2014. Follow for any symptoms. Cont daily ASA 81 mg  # HLP: at goal on simvastatin   # Unspecified Inflammatory Spondylopathy of lumbar region: no back pain, cont HEP and wieght loss measures  # Health Maintenance: medicare supervisit on 3/4/2024   Stress Management: feels he deals with stress well  Colon Cancer Screening: colonoscopy by Dr. Hayes Berkowitz in 10/20/2020 was normal. No further screening is advised.   Bone Health: educated on dietary calcium/vit D, weight bearing exercises and fall prevention.   Prostate Cancer Screening: see above  Vaccines: up to date with shingles, pneumovac 23, and prevnar 13.  TDAP 2023.  Advised on RSV, flu and COVID.   Living Will, Medical POA:   Daughter Carley Chandler would be main contact. Counseled again on making living will.    Goals of Care: Full code but no prolonged life support    [facility] PCP: cannot recall name.      The patient indicates understanding of these issues and agrees to the plan.  The patient is asked to return in 1 year for NEO Honeycutt MD

## 2024-03-04 NOTE — PATIENT INSTRUCTIONS
Please complete your fasting labs today.     I recommend the following vaccines -  RSV vaccine     Annual flu shot every September, October.    Stay up to date with COVID vaccines.

## 2024-03-06 DIAGNOSIS — E61.1 IRON DEFICIENCY: Primary | ICD-10-CM

## 2024-03-06 LAB
% SATURATION: 16 % (CALC) (ref 20–48)
ABSOLUTE BASOPHILS: 42 CELLS/UL (ref 0–200)
ABSOLUTE EOSINOPHILS: 28 CELLS/UL (ref 15–500)
ABSOLUTE LYMPHOCYTES: 1307 CELLS/UL (ref 850–3900)
ABSOLUTE MONOCYTES: 353 CELLS/UL (ref 200–950)
ABSOLUTE NEUTROPHILS: 2970 CELLS/UL (ref 1500–7800)
ALT: 13 U/L (ref 9–46)
AST: 22 U/L (ref 10–35)
BASOPHILS: 0.9 %
BUN: 20 MG/DL (ref 7–25)
CALCIUM: 9.1 MG/DL (ref 8.6–10.3)
CARBON DIOXIDE: 25 MMOL/L (ref 20–32)
CHLORIDE: 105 MMOL/L (ref 98–110)
CREATININE: 0.97 MG/DL (ref 0.7–1.28)
EGFR: 79 ML/MIN/1.73M2
EOSINOPHILS: 0.6 %
FERRITIN: 8 NG/ML (ref 24–380)
FOLATE, SERUM: 13.7 NG/ML
GLUCOSE: 97 MG/DL (ref 65–99)
HEMATOCRIT: 40.6 % (ref 38.5–50)
HEMOGLOBIN A1C: 5.7 % OF TOTAL HGB
HEMOGLOBIN: 13 G/DL (ref 13.2–17.1)
IRON BINDING CAPACITY: 437 MCG/DL (CALC) (ref 250–425)
IRON, TOTAL: 68 MCG/DL (ref 50–180)
LYMPHOCYTES: 27.8 %
MCH: 28 PG (ref 27–33)
MCHC: 32 G/DL (ref 32–36)
MCV: 87.3 FL (ref 80–100)
MONOCYTES: 7.5 %
MPV: 10 FL (ref 7.5–12.5)
NEUTROPHILS: 63.2 %
PLATELET COUNT: 246 THOUSAND/UL (ref 140–400)
POTASSIUM: 4.2 MMOL/L (ref 3.5–5.3)
RDW: 15.3 % (ref 11–15)
RED BLOOD CELL COUNT: 4.65 MILLION/UL (ref 4.2–5.8)
SODIUM: 139 MMOL/L (ref 135–146)
VITAMIN B12: 382 PG/ML (ref 200–1100)
WHITE BLOOD CELL COUNT: 4.7 THOUSAND/UL (ref 3.8–10.8)

## 2024-06-05 ENCOUNTER — OFFICE VISIT (OUTPATIENT)
Dept: INTERNAL MEDICINE CLINIC | Facility: CLINIC | Age: 79
End: 2024-06-05
Payer: COMMERCIAL

## 2024-06-05 VITALS
HEIGHT: 64.3 IN | HEART RATE: 76 BPM | TEMPERATURE: 98 F | SYSTOLIC BLOOD PRESSURE: 130 MMHG | OXYGEN SATURATION: 97 % | BODY MASS INDEX: 33.4 KG/M2 | DIASTOLIC BLOOD PRESSURE: 70 MMHG | RESPIRATION RATE: 15 BRPM | WEIGHT: 195.63 LBS

## 2024-06-05 DIAGNOSIS — M54.42 ACUTE MIDLINE LOW BACK PAIN WITH BILATERAL SCIATICA: Primary | ICD-10-CM

## 2024-06-05 DIAGNOSIS — M54.41 ACUTE MIDLINE LOW BACK PAIN WITH BILATERAL SCIATICA: Primary | ICD-10-CM

## 2024-06-05 DIAGNOSIS — R97.20 ELEVATED PSA: ICD-10-CM

## 2024-06-05 DIAGNOSIS — E78.2 MIXED HYPERLIPIDEMIA: ICD-10-CM

## 2024-06-05 DIAGNOSIS — E61.1 IRON DEFICIENCY: ICD-10-CM

## 2024-06-05 PROCEDURE — 1159F MED LIST DOCD IN RCRD: CPT | Performed by: INTERNAL MEDICINE

## 2024-06-05 PROCEDURE — 3075F SYST BP GE 130 - 139MM HG: CPT | Performed by: INTERNAL MEDICINE

## 2024-06-05 PROCEDURE — 1160F RVW MEDS BY RX/DR IN RCRD: CPT | Performed by: INTERNAL MEDICINE

## 2024-06-05 PROCEDURE — 3008F BODY MASS INDEX DOCD: CPT | Performed by: INTERNAL MEDICINE

## 2024-06-05 PROCEDURE — 3078F DIAST BP <80 MM HG: CPT | Performed by: INTERNAL MEDICINE

## 2024-06-05 PROCEDURE — 99214 OFFICE O/P EST MOD 30 MIN: CPT | Performed by: INTERNAL MEDICINE

## 2024-06-05 RX ORDER — MAGNESIUM OXIDE 400 MG (241.3 MG MAGNESIUM) TABLET
325 TABLET
COMMUNITY

## 2024-06-05 NOTE — PATIENT INSTRUCTIONS
Please continue your daily iron supplement and repeat your blood tests in 2-4 weeks. You do not need to fast.     You can use 400-800 mg of ibuprofen 3-4 times daily. You should not exceed 3200 mg in 24 hours. ALWAYS take with food. Do not continue this high dosing for more than 2-4 weeks as ibuprofen products can cause stomach ulcers, worsen heartburn, affect the kidneys and thin the blood.     Please do not exceed 3000 mg (3 grams) of tylenol in 24 hours.   It is very important you look at the amount of tylenol (acetaminophen) in any of your over the counter medications to ensure you do not exceed a safe amount of tylenol per day.    You can also use the following topical agents for your pain. They are over the counter:  1. Topical lidocaine (lidoderm patches). Wear for 12 hours and off for 12 hours  2. Topical diclofenac gel can be used several times/day

## 2024-06-05 NOTE — PROGRESS NOTES
Dameon El is a 78 year old male.     HPI:   Patient presents for the following  Iron deficiency anemia - on ASA for CADz. He is taking iron supplement every day. He donated blood 8 weeks ago and his HGB was fine. He has donated blood twice this year.   Pain all over his body - not sure if it is joints or muscles. More pain in right buttock. Episodes improve with movement. Sometimes it is hard for him to walk. This pain wakes him up in middle of night. Tries to hydrate. Pain is better if he lays on his left side. Symptoms worsen if he sits too long. Symptoms are worse in right buttock and both calves. Started 2 months ago. No falls or injury. Denies morning stiffness or hand symptoms. Does yoga on Fridays which helps. Stretching helps. Has tingling/numbness in right leg. Symptoms are every day now. Advil helps  HLP - tolerating statin therapy     REVIEW OF SYSTEMS:   GENERAL HEALTH: feels well otherwise. No f/c  NEURO: denies any headaches, LH, dizzyness, LOC, falls  VISION: denies any blurred or double vision  RESPIRATORY: denies shortness of breath, cough, or congestion  CARDIOVASCULAR: denies chest pain, pressure or palpitations  GI: denies abdominal pain, constipation, diarrhea, n/v, BRBPR, melena  : no dysuria or hematuria  PSYCH: mood is stable. Denies depression. He is coping well with anxiety.   EXT: denies edema       Wt Readings from Last 6 Encounters:   06/05/24 195 lb 9.6 oz (88.7 kg)   03/04/24 192 lb 6.4 oz (87.3 kg)   07/29/22 212 lb 12.8 oz (96.5 kg)   10/15/21 207 lb 12.8 oz (94.3 kg)   09/03/21 211 lb 12.8 oz (96.1 kg)   04/23/21 212 lb (96.2 kg)       Allergies   Allergen Reactions    Lipitor [Atorvastatin Calcium] MYALGIA     TABS    Pcn [Penicillins] HIVES and RASH       Family History   Problem Relation Age of Onset    Diabetes Mother     Cancer Neg       Past Medical History:    Agent orange exposure    BPH (benign prostatic hyperplasia)    Calculus of kidney    Cataract    CORONARY  ARTERY DISEASE    MYOCARDIAL INFARCTION w/o intervention    Coronary atherosclerosis    Dermatophytosis of nail    onychomycosis    Diverticulosis    Glucose intolerance (pre-diabetes)    Heart attack (HCC)    High cholesterol    Other and unspecified hyperlipidemia    Unspecified essential hypertension    Visual impairment    glasses      Past Surgical History:   Procedure Laterality Date    Appendectomy  as a child    Colonoscopy  05/08/2007    NL    Colonoscopy N/A 5/13/2016    Procedure: COLONOSCOPY;  Surgeon: David Coyle MD;  Location:  ENDOSCOPY    Colonoscopy N/A 10/20/2020    Procedure: COLONOSCOPY;  Surgeon: Fracisco Berkowitz MD;  Location:  ENDOSCOPY    Other surgical history  1960    PILONIDAL CYST      Social History:    Social History     Socioeconomic History    Marital status: Single   Tobacco Use    Smoking status: Never    Smokeless tobacco: Never   Vaping Use    Vaping status: Never Used   Substance and Sexual Activity    Alcohol use: Yes     Comment: 2 drinks/week    Drug use: No   Other Topics Concern    Caffeine Concern Yes     Comment: 2 cups of coffee daily.     Exercise Yes     Comment: 2x/week.           EXAM:   /70 (BP Location: Left arm, Patient Position: Sitting, Cuff Size: adult)   Pulse 76   Temp 97.9 °F (36.6 °C) (Temporal)   Resp 15   Ht 5' 4.3\" (1.633 m)   Wt 195 lb 9.6 oz (88.7 kg)   SpO2 97%   BMI 33.26 kg/m²   GENERAL: A&O well developed, well nourished,in no apparent distress  SKIN: no rashes,no suspicious lesions  HEENT: atraumatic, MMM, throat is clear  NECK: supple, no jvd, no thyromegaly  LUNGS: clear to auscultation bilateraly, no c/w/r  CARDIO: RRR without g/m/r  GI: obese, softly distended, non-tender, no HSM, normal BS throughout   NEURO: CN 2-12 grossly intact, strength 5/5 bilateral LE. Reflexes 2+ bilateral LE  PSYCH: pleasant  EXTREMITIES: no cyanosis, clubbing or edema  BACK: no spinal or paraspinal tenderness. Buttock pain is reproduced with  bilateral SLR. Internal and external hip rotation, abduction/adduction of hip does not reproduce pain.       ASSESSMENT AND PLAN:   # Acute midline low back pain with bilateral sciatica - start PT and cont HEP. He declines rx nsaids. If no improvement with PT, order imaging and refer to pain management.   # BPH and Elevated PSA: symptoms are well controlled on flomax. He is following with urology. PSA was stable in 7/2023 and he wants to repeat in 1 year.   # Iron Deficiency Anemia: he is still donating blood a few times/year. Cont oral iron replacement and re-check labs in 4 weeks.   - normal EGD and colonoscopy by Hayes Berkowitz in 10/2020.   # Obesity, BMI 32 and impaired fasting glucose: applauded on weight loss.  Counseled again on healthy plant based nutrition, regular exercise, and practicing mindfulness. We outlined small, achievable goals.   # CADz: states he had an MI in 2005 but cadiac cath showed mild disease so no stent was placed. Normal stress test in 5/2014. Follow for any symptoms. Cont daily ASA 81 mg  # HLP: at goal on simvastatin   # Unspecified Inflammatory Spondylopathy of lumbar region: no back pain, cont HEP and wieght loss measures  # Health Maintenance: medicare supervisit on 3/4/2024   Stress Management: feels he deals with stress well  Colon Cancer Screening: colonoscopy by Dr. Hayes Berkowitz in 10/20/2020 was normal. No further screening is advised.   Bone Health: educated on dietary calcium/vit D, weight bearing exercises and fall prevention.   Prostate Cancer Screening: see above  Vaccines: up to date with shingles, pneumovac 23, and prevnar 13.  TDAP 2023.  Advised on RSV, flu and COVID.   Living Will, Medical POA:   Daughter Carley Chandler would be main contact. Counseled again on making living will.    Goals of Care: Full code but no prolonged life support    [facility] PCP: cannot recall name.      The patient indicates understanding of these issues and agrees to the plan.  The patient is  asked to return in 1 year for MAS.   Diya Honeycutt MD

## 2024-06-12 ENCOUNTER — TELEPHONE (OUTPATIENT)
Dept: PHYSICAL THERAPY | Age: 79
End: 2024-06-12

## 2024-06-12 ENCOUNTER — TELEPHONE (OUTPATIENT)
Dept: PHYSICAL THERAPY | Facility: HOSPITAL | Age: 79
End: 2024-06-12

## 2024-06-13 ENCOUNTER — APPOINTMENT (OUTPATIENT)
Dept: PHYSICAL THERAPY | Age: 79
End: 2024-06-13
Attending: INTERNAL MEDICINE
Payer: MEDICARE

## 2024-06-13 ENCOUNTER — OFFICE VISIT (OUTPATIENT)
Dept: PHYSICAL THERAPY | Age: 79
End: 2024-06-13
Attending: INTERNAL MEDICINE
Payer: MEDICARE

## 2024-06-13 DIAGNOSIS — M54.41 ACUTE MIDLINE LOW BACK PAIN WITH BILATERAL SCIATICA: Primary | ICD-10-CM

## 2024-06-13 DIAGNOSIS — M54.42 ACUTE MIDLINE LOW BACK PAIN WITH BILATERAL SCIATICA: Primary | ICD-10-CM

## 2024-06-13 PROCEDURE — 97530 THERAPEUTIC ACTIVITIES: CPT

## 2024-06-13 PROCEDURE — 97110 THERAPEUTIC EXERCISES: CPT

## 2024-06-13 PROCEDURE — 97161 PT EVAL LOW COMPLEX 20 MIN: CPT

## 2024-06-13 NOTE — PROGRESS NOTES
SPINE EVALUATION:     Diagnosis:   LBP       Referring Provider: Diya Honeycutt  Date of Evaluation:    6/13/2024    Precautions:  None Next MD visit:   none scheduled  Date of Surgery: n/a     PATIENT SUMMARY   Dameon El is a 78 year old male who presents to therapy today with complaints of pain in R buttock that wakes him at night and improves with movement.He has had it more than 2 months. He also notes his calf muscles tightening up at times.  It is worse with sitting. He does note N and T in R leg. He takes Advil and times and did not want prescription NSAIDs. Per MD, he can be referred for imaging or pain clinic if he doesn't improve with PT.   Notes he stiffens up when he sits.  Has had cramps in both legs. Bowls, walks, does yoga. Has R shoulder pain . Reports he has had a couple falls in the past couple years and may have chico his shoulder.  Works as a  and is on his feet a lot - 16 hours a week.  Has to limit some of his chair yoga due to pain. Sometimes not numbness in R posterior thigh. Recalls episodes of R and L sciatica in the past.   Pt describes pain level current 1/10, at best 0/10, at worst 0/10.   R buttock pain currently   Current functional limitations include lifting, limps on stairs. .     Dameon describes prior level of function . Pt goals include ease pain. .  Past medical history was reviewed with Dameon. Significant findings include h/o heart attack    Pt denies diplopia, dysarthria, dysphasia, dizziness, drop attacks, bowel/bladder changes, saddle anesthesia, and XIAO LE N/T.    ASSESSMENT  Dameon presents to physical therapy evaluation with primary c/o bilateral buttock pain, R worse than L of 2+ mos duration. . The results of the objective tests and measures show + SLR R and + prone knee bend R/L for buttock pain as well as + Trendelenburg gait and weakness at bilateral hip abductors.  Lateral flexion R increases R sided pain .  Functional deficits include but are  not limited to sitting more than 10 mins, lifting, rolling and turning in bed. .  Signs and symptoms are consistent with diagnosis of lumbar nerve root irritation with pain and core weakness.   He also has R shoulder pain consistent with RTC tendinitis that may benefit from PT after his low back rehab. . Pt and PT discussed evaluation findings, pathology, POC and HEP.  Pt voiced understanding and performs HEP correctly without reported pain. Skilled Physical Therapy is medically necessary to address the above impairments and reach functional goals.     OBJECTIVE:   Observation/Posture: sits with hips abducted   L hammer toes 2-4th digits   Neuro Screen:  R knee jerk=+1, L= +2, bilateral ankle jerk +2  Sensation intact to light touch at bilateral LEs    Trunk AROM: (* denotes performed with pain)  Flexion: min restricted, increased pull in R posterior thigh  Extension: min restricted   Sidebending: R: + R sided pain with lateral flexion R ; L min restricted  Rotation: R /L= min restricted     Accessory motion: decreased mobility to CPA to L3-4   Palpation: unremarkable     Strength: (* denotes performed with pain)  Able to heel/toe walk bilaterally   LE   WNL except bilateral hip   Abd= 3+/5  L EHL= 4-/5        Flexibility:   LE   Hip Flexor: R/L = min restricted   Hamstrings: R/L: WNL with reproduction of R/L posterior thigh pain   Piriformis: R/L hip int rot restricted bilaterally   Quads: R/L= restricted with reproduction of back pain at 100 deg   Gastroc-soleus: R/L min restricted     Special tests:   - Nael, - Quadrant R/lL  + SLR R and L  at 55 deg  for paresthesia on R, + bilateral DF sensitizer   + prone knee bend R/L for R LE paresthesia     R shoulder: + pain with isometric ER, hand behind back, lift off, and at end range flexion and abduction   + TTP long head of R biceps    Gait: pt ambulates on level ground with + Trendelenburg R and L.  Balance: SLS R=0 sec   increased R LE pain , L = 2 secs      Today’s Treatment and Response:   Pt education was provided on exam findings, treatment diagnosis, treatment plan, expectations, and prognosis. Pt was also provided recommendations for postural corrections, ergonomics, and importance of remaining active . 10 mins on direct  therapeutic activity with practice of bed mobility , transfers, body mechanics, use of pillow for sitting and at night.  Instructed on use of ice for R low back and R RTC   Patient was instructed in and issued a HEP for: 2 position piriformis stretches R/L .  Trial of hip abd with BTB but deferred due to increased R posterior lateral thigh pain    Charges: PT Eval Low Complexity, 15ex, 10 mins TA      Total Timed Treatment: 25 min     Total Treatment Time: 45 min     PLAN OF CARE:    Goals: (to be met in 10 visits)   Pt will improve transversus abdominis recruitment to perform proper isometric contraction without requiring verbal or tactile cuing to promote advancement of therex   Pt will demonstrate good understanding of proper posture and body mechanics to decrease pain and improve spinal safety   Improve bilateral hip strength 1/2 grade to 4/5 to improve gait pattern, decrease Trendelenburg   Pt will report improved symptom centralization and absence of radicular symptoms for 3 consecutive days to improve function with ADL   Improve sitting posture to allow patient to sit 10 mins without radicular symptoms.   Pt will demonstrate improved core strength to be able to perform rolling in bed with <2/10 pain   Pt will be independent and compliant with comprehensive HEP to maintain progress achieved in PT      Frequency / Duration: Patient will be seen for 2 x/week or a total of 10 visits over a 90 day period. Treatment will include:     Education or treatment limitation: None  Rehab Potential:good      LEFS Score: 77.5 % (6/13/2024 11:42 AM)      Patient/Family/Caregiver was advised of these findings, precautions, and treatment options and has  agreed to actively participate in planning and for this course of care.    Thank you for your referral. Please co-sign or sign and return this letter via fax as soon as possible to [phone]. If you have any questions, please contact me at Dept: [phone]    Sincerely,  Electronically signed by therapist: Robert Enrique PT    Physician's certification required: Yes  I certify the need for these services furnished under this plan of treatment and while under my care.    X___________________________________________________ Date____________________    Certification From: 6/13/2024  To:9/11/2024

## 2024-06-18 ENCOUNTER — APPOINTMENT (OUTPATIENT)
Dept: PHYSICAL THERAPY | Age: 79
End: 2024-06-18
Attending: INTERNAL MEDICINE
Payer: MEDICARE

## 2024-06-21 ENCOUNTER — OFFICE VISIT (OUTPATIENT)
Dept: PHYSICAL THERAPY | Age: 79
End: 2024-06-21
Attending: INTERNAL MEDICINE
Payer: MEDICARE

## 2024-06-21 PROCEDURE — 97110 THERAPEUTIC EXERCISES: CPT

## 2024-06-21 PROCEDURE — 97140 MANUAL THERAPY 1/> REGIONS: CPT

## 2024-06-21 NOTE — PROGRESS NOTES
Insurance Primary/Secondary: [facility] MEDICARE / N/A     # Auth Visits: 10 in poc          Diagnosis:   LBP         Referring Provider: Diya Honeycutt    Date of Evaluation:  6/13/2024  Precautions:  None Next MD visit: none scheduled  Available lumbar xrays are from 2011 ( DJD of lumbar facets, L3-4 disc narrowing).     Subjective:   . Continues to note bilateral buttock pain R worse than L, irritated by standing as well as sitting.Has pain with rolling or turning in bed.  Doing yoga once a week.Stretches help     Pain: 5/10      Objective:   + TTP R piriformis/sciatic notch  + R posterior thigh pain with SLR at 60 deg with DF sensitizer         Assessment:  Walking irritates buttock pain and causes pull in R hamstrings as well as R or L   calf mm at times.    Poor tolerance of prone lying , even with pillow or CPA to lumbar spine.  Both produced  increased buttock pain noted. Gets relief from piriformis stretch with external rotation  bias. Good tolerance of supine flexion and hook lying exercises.     Goals: (to be met in 10 visits)   Pt will improve transversus abdominis recruitment to perform proper isometric contraction without requiring verbal or tactile cuing to promote advancement of therex   Pt will demonstrate good understanding of proper posture and body mechanics to decrease pain and improve spinal safety   Improve bilateral hip strength 1/2 grade to 4/5 to improve gait pattern, decrease Trendelenburg   Pt will report improved symptom centralization and absence of radicular symptoms for 3 consecutive days to improve function with ADL   Improve sitting posture to allow patient to sit 10 mins without radicular symptoms.   Pt will demonstrate improved core strength to be able to perform rolling in bed with <2/10 pain   Pt will be independent and compliant with comprehensive HEP to maintain progress achieved in PT     Plan: Continue lumbar stabilization exercises, flexion bias.  Progress R nerve  glides to include ankle DF/PF   Date: 6/21/2024  TX#: 2/9 dao Date:                 TX#: 3/ Date:                 TX#: 4/ Date:                 TX#: 5/ Date:   Tx#: 6/   Exercise 30 mins  - walk 1/10 of mile  - Nustep 5 mins while reviewing abdominal brace in walking and turning in bed. Discussed car seat cushion/position, sleep position        ADIM, hook lying march x15 R/L         Bilateral supine piriformis stretches , fig 4 position . 5 mins ( relieves)       Hip add with ball x20   Hip abd with RTB x20  ( Irritating)   L1A  abdominal : difficult to control lumbar position        Manual  15 mins   Prone CPA to lumbar spine GR II-III, 20 oscillations   - L side lying lumbar rotation Gr II-III  - STM to R lumbar paraspinals ans R piriformis   - Manual stretch into R hip flexion, ER/IR        HEP: 2 position piriformis stretches R/L .  Trial of hip abd with BTB ( stopped due to sciatic irritation)     6/21/2024  supine KE x10 R/L ,  supine march x15 R/L     Charges: m (15') ex 2 (30')       Total Timed Treatment: 45 min  Total Treatment Time: 45 min

## 2024-06-26 ENCOUNTER — OFFICE VISIT (OUTPATIENT)
Dept: PHYSICAL THERAPY | Age: 79
End: 2024-06-26
Attending: INTERNAL MEDICINE
Payer: MEDICARE

## 2024-06-26 PROCEDURE — 97110 THERAPEUTIC EXERCISES: CPT | Performed by: PHYSICAL THERAPIST

## 2024-06-26 PROCEDURE — 97140 MANUAL THERAPY 1/> REGIONS: CPT | Performed by: PHYSICAL THERAPIST

## 2024-06-26 NOTE — PROGRESS NOTES
Insurance Primary/Secondary: [facility] MEDICARE / N/A     # Auth Visits: 10 in poc          Diagnosis:   LBP         Referring Provider: Diya Honeycutt    Date of Evaluation:  6/13/2024  Precautions:  None Next MD visit: none scheduled  Available lumbar xrays are from 2011 ( DJD of lumbar facets, L3-4 disc narrowing).     Subjective:   . Continues to note bilateral buttock pain R worse than L, irritated by standing as well as sitting.Has less pain with rolling or turning in bed.  Feels that new, revised HEP is helping decrease symptoms.    Pain: 3/10 currently R buttock only, spreads to R calf with lumbar flexion.      Objective:   + TTP R piriformis/sciatic notch  + R posterior thigh pain with SLR at 60 deg with DF sensitizer   Pt responds to L rotation in flexion, this abolishes calf symptoms and decreases R buttock symptoms to 1/10.        Assessment:  standing lumbar flexion irritates R buttock pain and causes pull in R hamstrings as well as R calf mm.    Responds to rotation in flexion in the clinic today, added this to HEP for further assessment.    Goals: (to be met in 10 visits)   Pt will improve transversus abdominis recruitment to perform proper isometric contraction without requiring verbal or tactile cuing to promote advancement of therex   Pt will demonstrate good understanding of proper posture and body mechanics to decrease pain and improve spinal safety   Improve bilateral hip strength 1/2 grade to 4/5 to improve gait pattern, decrease Trendelenburg   Pt will report improved symptom centralization and absence of radicular symptoms for 3 consecutive days to improve function with ADL   Improve sitting posture to allow patient to sit 10 mins without radicular symptoms.   Pt will demonstrate improved core strength to be able to perform rolling in bed with <2/10 pain   Pt will be independent and compliant with comprehensive HEP to maintain progress achieved in PT     Plan: Continue lumbar  stabilization exercises, flexion bias. Pt to trial self flex/rot mob and assess response. Progress R nerve glides to include ankle DF/PF   Date: 6/21/2024  TX#: 2/9 dao Date:   6/26/24              TX#: 3/9 Date:                 TX#: 4/ Date:                 TX#: 5/ Date:   Tx#: 6/   Exercise 30 mins  - walk 1/10 of mile  - Nustep 5 mins while reviewing abdominal brace in walking and turning in bed. Discussed car seat cushion/position, sleep position   exercise 30 mins  - walk 3/10 of mile  - lumbar ext in standing x 10, inc buttock, no worse  - lumbar flexion in standing x 10: peripheralize to calf, worse      ADIM, hook lying march x15 R/L   Cable amanda retro walk 25# x 10, inc, worse R buttock  - seated trunk rot x 10, no effect       Bilateral supine piriformis stretches , fig 4 position . 5 mins ( relieves)  - LTR x 20, nil symptoms  -       Hip add with ball x20   Hip abd with RTB x20  ( Irritating)   L1A  abdominal : difficult to control lumbar position   Hip add with ball x20   Hip abd with RTB x20  ( Irritating)   L1A  abdominal : difficult to control lumbar position       Manual  15 mins   Prone CPA to lumbar spine GR II-III, 20 oscillations   - L side lying lumbar rotation Gr II-III  - STM to R lumbar paraspinals ans R piriformis   - Manual stretch into R hip flexion, ER/IR   Manual  13 mins  -  man L flex/rot mobs gr 3 x 1 min, dec B  - self flex/rot x 1 min, dec, B  - L side lying lumbar rotation Gr II-III  - STM to R lumbar paraspinals ans R piriformis      HEP: 2 position piriformis stretches R/L .  Trial of hip abd with BTB ( stopped due to sciatic irritation) ADD: 6/26/24 self flex/rot mob    6/21/2024  supine KE x10 R/L ,  supine march x15 R/L     Charges: m (13') ex 2 (30')       Total Timed Treatment: 43 min  Total Treatment Time: 43 min

## 2024-06-28 ENCOUNTER — OFFICE VISIT (OUTPATIENT)
Dept: PHYSICAL THERAPY | Age: 79
End: 2024-06-28
Attending: INTERNAL MEDICINE
Payer: MEDICARE

## 2024-06-28 PROCEDURE — 97140 MANUAL THERAPY 1/> REGIONS: CPT

## 2024-06-28 PROCEDURE — 97110 THERAPEUTIC EXERCISES: CPT

## 2024-06-28 NOTE — PROGRESS NOTES
Insurance Primary/Secondary: [facility] MEDICARE / N/A     # Auth Visits: 10 in poc          Diagnosis:   LBP         Referring Provider: Diya Honeycutt    Date of Evaluation:  6/13/2024  Precautions:  None Next MD visit: none scheduled  Available lumbar xrays are from 2011 ( DJD of lumbar facets, L3-4 disc narrowing).     Subjective:    R buttock pain has subsided a little. Not as bad at night . Still gets numbness in L calf at times. . Continues to note bilateral buttock pain R worse than L, irritated by standing as well as sitting.Has less pain with rolling or turning in bed.     Pain:   0/10 currently R buttock only, spreads to R calf with lumbar flexion.  ( R buttock goes up to 5/10 at times)       Objective:   + TTP R piriformis/sciatic notch  + R posterior thigh pain with SLR at 60 deg with DF sensitizer   Pt responds to L rotation in flexion, this abolishes calf symptoms and decreases R buttock symptoms to 1/10.    Lateral flexion R irritates R lateral leg  Lumbar flex/ext: no reproduction of sxs    R hip abd= 4-5 with posterior thigh pain   L hip abd=  4/5        Assessment:  R buttock and posterior thigh pain remain irritable with  sitting, prolonged standing.     Responds to rotation in flexion in supine. Continues to have  +R SLR. Continued with R nerve glides to include ankle DF/PF for desensitization of neural irritation.    Goals: (to be met in 10 visits)   Pt will improve transversus abdominis recruitment to perform proper isometric contraction without requiring verbal or tactile cuing to promote advancement of therex   Pt will demonstrate good understanding of proper posture and body mechanics to decrease pain and improve spinal safety   Improve bilateral hip strength 1/2 grade to 4/5 to improve gait pattern, decrease Trendelenburg   Pt will report improved symptom centralization and absence of radicular symptoms for 3 consecutive days to improve function with ADL   Improve sitting posture to  allow patient to sit 10 mins without radicular symptoms.   Pt will demonstrate improved core strength to be able to perform rolling in bed with <2/10 pain   Pt will be independent and compliant with comprehensive HEP to maintain progress achieved in PT     Plan: 7/5 10am  Continue lumbar stabilization exercises, flexion bias.  Date: 6/21/2024  TX#: 2/9 dao Date:   6/26/24              TX#: 3/9 Date:   6/28/2024               TX#: 4/9 Date:                 TX#: 5/ Date:   Tx#: 6/   Exercise 30 mins  - walk 1/10 of mile  - Nustep 5 mins while reviewing abdominal brace in walking and turning in bed. Discussed car seat cushion/position, sleep position   exercise 30 mins  - walk 3/10 of mile  - lumbar ext in standing x 10, inc buttock, no worse  - lumbar flexion in standing x 10: peripheralize to calf, worse Exercise : 35 mins  Nustep: 5 mins   - discussed adaptation of car seat with pillows    - Shuttle L4 DLP x20 pain R buttocks  - DHR L3 x15     ADIM, hook lying march x15 R/L   Cable amanda retro walk 25# x 10, inc, worse R buttock  - seated trunk rot x 10, no effect Self stretch into  R/L lumbar rotation 30 sec x3   ( tolerated R with min   irritation)   - supine piriformis stretches ER bias  30 sec x3   - seated piriformis stretches R/L 30 sec x3      Bilateral supine piriformis stretches , fig 2 position . 5 mins ( relieves)  - LTR x 20, nil symptoms  -  Sit to stand x10( cues for ADIM)        Manual 10 mins    stretch into R hip flexion, ER/IR ,   Guided neural glides,   Piriformis stretches          Hip add with ball x20   Hip abd with RTB x20  ( Irritating)   L1A  abdominal : difficult to control lumbar position   Hip add with ball x20   Hip abd with RTB x20  ( Irritating)   L1A  abdominal : difficult to control lumbar position  Row with yellow sport cord x15, cues for ADIM     Manual  15 mins   Prone CPA to lumbar spine GR II-III, 20 oscillations   - L side lying lumbar rotation Gr II-III  - STM to R lumbar  paraspinals ans R piriformis   - Manual stretch into R hip flexion, ER/IR   Manual  13 mins  -  man L flex/rot mobs gr 3 x 1 min, dec B  - self flex/rot x 1 min, dec, B  - L side lying lumbar rotation Gr II-III  - STM to R lumbar paraspinals ans R piriformis Supine R knee extension : increases R posterior thigh discomfort : added ankle DF/PF x10  - L supine KE x10  , added  ankle DF/PF    L clam x15 ( aggravating)      HEP: 2 position piriformis stretches R/L .  Trial of hip abd with BTB ( stopped due to sciatic irritation)     6/21/2024  supine KE x10 R/L ,  supine march x15 R/L   6/26/24 self flex/rot mob    Charges: m (10') ex 2 (35')       Total Timed Treatment: 45min  Total Treatment Time: 45 min

## 2024-07-05 ENCOUNTER — OFFICE VISIT (OUTPATIENT)
Dept: PHYSICAL THERAPY | Age: 79
End: 2024-07-05
Attending: INTERNAL MEDICINE
Payer: MEDICARE

## 2024-07-05 PROCEDURE — 97140 MANUAL THERAPY 1/> REGIONS: CPT

## 2024-07-05 PROCEDURE — 97110 THERAPEUTIC EXERCISES: CPT

## 2024-07-05 NOTE — PROGRESS NOTES
Insurance Primary/Secondary: [facility] MEDICARE / N/A     # Auth Visits: 10 in poc          Diagnosis:   LBP         Referring Provider: Diya Honeycutt    Date of Evaluation:  6/13/2024  Precautions:  None Next MD visit: none scheduled  Available lumbar xrays are from 2011 ( DJD of lumbar facets, L3-4 disc narrowing).     Subjective:   Was able to do a yoga class with minimal  R buttock pain. Not as bad at night . No  L calf numbness in past couple days.  . Continues to note bilateral buttock pain R worse than L, irritated by standing as well as sitting.    Pain:   0/10 currently R buttock only, spreads to R calf with lumbar flexion.  ( R buttock goes up to 5/10 at times)       Objective:   + TTP R piriformis/sciatic notch  + R posterior thigh pain with SLR at 60 deg with DF sensitizer   Pt responds to L rotation in flexion, this abolishes calf symptoms and decreases R buttock symptoms to 1/10.    Lateral flexion R irritates R lateral leg  Lumbar flex/ext: no reproduction of sxs    R hip abd= 4-5 with posterior thigh pain   L hip abd=  4/5        Assessment:  R buttock and posterior thigh pain remain irritable with  sitting, prolonged standing.     Responds to rotation in flexion in supine. Continues to have  +R SLR. Continued with R nerve glides to include ankle DF/PF for desensitization of neural irritation. Side lying L increased  R LBP/ buttock pain to 5/10. Applied supine lumbar rotation L to ease.    R shoulder ER irritates pain with tension greater than YTB    Goals: (to be met in 10 visits)   Pt will improve transversus abdominis recruitment to perform proper isometric contraction without requiring verbal or tactile cuing to promote advancement of therex   Pt will demonstrate good understanding of proper posture and body mechanics to decrease pain and improve spinal safety   Improve bilateral hip strength 1/2 grade to 4/5 to improve gait pattern, decrease Trendelenburg   Pt will report improved symptom  centralization and absence of radicular symptoms for 3 consecutive days to improve function with ADL   Improve sitting posture to allow patient to sit 10 mins without radicular symptoms.   Pt will demonstrate improved core strength to be able to perform rolling in bed with <2/10 pain   Pt will be independent and compliant with comprehensive HEP to maintain progress achieved in PT     Plan:  Continue lumbar stabilization exercises, flexion bias.  7/11, 7/26  Date: 6/21/2024  TX#: 2/9 dao Date:   6/26/24              TX#: 3/9 Date:   6/28/2024               TX#: 4/9 Date:         7/5/2024         TX#: 5/9 Date:   Tx#: 6/   Exercise 30 mins  - walk 1/10 of mile  - Nustep 5 mins while reviewing abdominal brace in walking and turning in bed. Discussed car seat cushion/position, sleep position   exercise 30 mins  - walk 3/10 of mile  - lumbar ext in standing x 10, inc buttock, no worse  - lumbar flexion in standing x 10: peripheralize to calf, worse Exercise : 35 mins  Nustep: 5 mins   - discussed adaptation of car seat with pillows    - Shuttle L4 DLP x20 pain R buttocks  - DHR L3 x15 Exercise  35 mins  - march with ADIM x15  - supine triceps ext 6# x15  - supine ER bias piriformis stretch Rf/L   - Verbal review of pain free shoulder ER/IR:  R ER 2.5#  pain x10  R IR 2.5# pain free x10   - B row  7.5# x15   pain free   - supine triceps ext 8# x20  Discussed seated row on FC machine.       ADIM, hook lying march x15 R/L   Cable amanda retro walk 25# x 10, inc, worse R buttock  - seated trunk rot x 10, no effect Self stretch into  R/L lumbar rotation 30 sec x3   ( tolerated R with min   irritation)   - supine piriformis stretches ER bias  30 sec x3   - seated piriformis stretches R/L 30 sec x3 Shuttle DLP L 4 x30  R/L   SLP L3 x20   - heel raises DHR L2      Bilateral supine piriformis stretches , fig 2 position . 5 mins ( relieves)  - LTR x 20, nil symptoms  -  Sit to stand x10( cues for ADIM)  Sit to stand x10       Manual 10 mins    stretch into R hip flexion, ER/IR ,   Guided neural glides,   Piriformis stretches      Manual 10 mins    stretch into R hip flexion, ER/IR ,   Guided neural glides,   Piriformis stretches     Hip add with ball x20   Hip abd with RTB x20  ( Irritating)   L1A  abdominal : difficult to control lumbar position   Hip add with ball x20   Hip abd with RTB x20  ( Irritating)   L1A  abdominal : difficult to control lumbar position  Row with yellow sport cord x15, cues for ADIM R stretch into supine hip IR/lumbar rotation     Manual  15 mins   Prone CPA to lumbar spine GR II-III, 20 oscillations   - L side lying lumbar rotation Gr II-III  - STM to R lumbar paraspinals ans R piriformis   - Manual stretch into R hip flexion, ER/IR   Manual  13 mins  -  man L flex/rot mobs gr 3 x 1 min, dec B  - self flex/rot x 1 min, dec, B  - L side lying lumbar rotation Gr II-III  - STM to R lumbar paraspinals ans R piriformis Supine R knee extension : increases R posterior thigh discomfort : added ankle DF/PF x10  - L supine KE x10  , added  ankle DF/PF    L clam x15 ( aggravating)  Discussed strengthening UE ex in patients chair yoga program with 2# wts     HEP: 2 position piriformis stretches R/L .  Trial of hip abd with BTB ( stopped due to sciatic irritation)     6/21/2024  supine KE x10 R/L ,  supine march x15 R/L   6/26/24 self flex/rot mob    Charges: m (10') ex 2 (35')       Total Timed Treatment: 45min  Total Treatment Time: 45 min

## 2024-07-08 ENCOUNTER — HOSPITAL ENCOUNTER (OUTPATIENT)
Age: 79
Discharge: HOME | End: 2024-07-08
Payer: MEDICARE

## 2024-07-08 VITALS
HEIGHT: 65 IN | WEIGHT: 190 LBS | RESPIRATION RATE: 18 BRPM | DIASTOLIC BLOOD PRESSURE: 82 MMHG | BODY MASS INDEX: 31.65 KG/M2 | SYSTOLIC BLOOD PRESSURE: 123 MMHG | TEMPERATURE: 98 F | OXYGEN SATURATION: 97 % | HEART RATE: 80 BPM

## 2024-07-08 DIAGNOSIS — R21 RASH: Primary | ICD-10-CM

## 2024-07-08 LAB
#MXD IC: 0.4 X10ˆ3/UL (ref 0.1–1)
HCT VFR BLD AUTO: 38.8 %
HGB BLD-MCNC: 12.4 G/DL
LYMPHOCYTES # BLD AUTO: 2 X10ˆ3/UL (ref 1–4)
LYMPHOCYTES NFR BLD AUTO: 41.5 %
MCH RBC QN AUTO: 28.6 PG (ref 26–34)
MCHC RBC AUTO-ENTMCNC: 32 G/DL (ref 31–37)
MCV RBC AUTO: 89.6 FL (ref 80–100)
MIXED CELL %: 8.5 %
NEUTROPHILS # BLD AUTO: 2.5 X10ˆ3/UL (ref 1.5–7.7)
NEUTROPHILS NFR BLD AUTO: 50 %
PLATELET # BLD AUTO: 244 X10ˆ3/UL (ref 150–450)
RBC # BLD AUTO: 4.33 X10ˆ6/UL
WBC # BLD AUTO: 4.9 X10ˆ3/UL (ref 4–11)

## 2024-07-08 PROCEDURE — 99213 OFFICE O/P EST LOW 20 MIN: CPT | Performed by: NURSE PRACTITIONER

## 2024-07-08 PROCEDURE — 85025 COMPLETE CBC W/AUTO DIFF WBC: CPT | Performed by: NURSE PRACTITIONER

## 2024-07-08 RX ORDER — TRIAMCINOLONE ACETONIDE 1 MG/G
CREAM TOPICAL 2 TIMES DAILY
Qty: 1 EACH | Refills: 0 | Status: SHIPPED | OUTPATIENT
Start: 2024-07-08 | End: 2024-07-18

## 2024-07-08 RX ORDER — LORATADINE 10 MG/1
10 TABLET ORAL DAILY
Qty: 30 TABLET | Refills: 0 | Status: SHIPPED | OUTPATIENT
Start: 2024-07-08 | End: 2024-08-07

## 2024-07-08 NOTE — ED PROVIDER NOTES
Patient Seen in: [facility]      History     Chief Complaint   Patient presents with    Rash Skin Problem     Stated Complaint: rash on arms & legs    Subjective:   HPI  78-year-old male with BPH, hyperlipidemia, and MI presents complaining of itchy rash to his arms and legs after applying a insect repellent on Wednesday with a rash starting on Friday.  He did apply the repellent again.  He denies any cough or cold symptoms.    Objective:   Past Medical History:    Agent orange exposure    BPH (benign prostatic hyperplasia)    Calculus of kidney    Cataract    CORONARY ARTERY DISEASE    MYOCARDIAL INFARCTION w/o intervention    Coronary atherosclerosis    Dermatophytosis of nail    onychomycosis    Diverticulosis    Glucose intolerance (pre-diabetes)    Heart attack (HCC)    High cholesterol    Other and unspecified hyperlipidemia    Unspecified essential hypertension    Visual impairment    glasses              Past Surgical History:   Procedure Laterality Date    Appendectomy  as a child    Colonoscopy  05/08/2007    NL    Colonoscopy N/A 5/13/2016    Procedure: COLONOSCOPY;  Surgeon: David Coyle MD;  Location:  ENDOSCOPY    Colonoscopy N/A 10/20/2020    Procedure: COLONOSCOPY;  Surgeon: Fracisco Berkowitz MD;  Location:  ENDOSCOPY    Other surgical history  1960    PILONIDAL CYST                Social History     Socioeconomic History    Marital status: Single   Tobacco Use    Smoking status: Never    Smokeless tobacco: Never   Vaping Use    Vaping status: Never Used   Substance and Sexual Activity    Alcohol use: Yes     Comment: 2 drinks/week    Drug use: No   Other Topics Concern    Caffeine Concern Yes     Comment: 2 cups of coffee daily.     Exercise Yes     Comment: 2x/week.              Review of Systems   All other systems reviewed and are negative.      Positive for stated Chief Complaint: Rash Skin Problem    Other systems are as noted in HPI.  Constitutional and vital signs  reviewed.      All other systems reviewed and negative except as noted above.    Physical Exam     ED Triage Vitals [07/08/24 1731]   /82   Pulse 80   Resp 18   Temp 97.7 °F (36.5 °C)   Temp src Temporal   SpO2 97 %   O2 Device None (Room air)       Current Vitals:   Vital Signs  BP: 123/82  Pulse: 80  Resp: 18  Temp: 97.7 °F (36.5 °C)  Temp src: Temporal    Oxygen Therapy  SpO2: 97 %  O2 Device: None (Room air)            Physical Exam  Vitals and nursing note reviewed.   Constitutional:       Appearance: He is well-developed.   Cardiovascular:      Rate and Rhythm: Normal rate and regular rhythm.      Heart sounds: Normal heart sounds.   Pulmonary:      Effort: Pulmonary effort is normal.      Breath sounds: Normal breath sounds.   Skin:     General: Skin is warm and dry.      Findings: Rash (Papular petechial rash to the dorsal aspects of his arms and legs to areas that were not covered by clothing) present.   Neurological:      Mental Status: He is alert and oriented to person, place, and time.               ED Course     Labs Reviewed   POCT CBC - Abnormal; Notable for the following components:       Result Value    HGB IC 12.4 (*)     HCT IC 38.8 (*)     All other components within normal limits                      MDM     Medical Decision Making  78-year-old male with BPH, hyperlipidemia, and MI presents complaining of itchy rash to his arms and legs after applying a insect repellent on Wednesday with a rash starting on Friday.  He did apply the repellent again.  He denies any cough or cold symptoms.    Pertinent Labs reviewed. (See chart for details).  Patient coming in with rash.   Differential diagnosis includes but not limited to contact dermatitis, rash, petechiae, thrombocytopenia and she currently  Labs reviewed CBC normal.  Will treat for rash.  Will discharge on triamcinolone and Claritin.  Follow-up with dermatology as needed. patient is comfortable with this plan.    Overall Pt looks good.  Non-toxic, well-hydrated and in no respiratory distress. Vital signs are reassuring. Exam is reassuring. I do not believe pt requires and additional diagnostic studies or intervention. I believe pt can be discharged home to continue evaluation as an outpatient. Follow-up provider given. Discharge instructions given and reviewed. Return for any problems. All understand and agree with the plan.    Please note that this report has been produced using speech recognition software and may contain errors related to that system including, but not limited to, errors in grammar, punctuation, and spelling, as well as words and phrases that possibly may have been recognized inappropriately. If there are any questions or concerns, contact the dictating provider for clarification.    Problems Addressed:  Rash: acute illness or injury        Disposition and Plan     Clinical Impression:  1. Rash         Disposition:  Discharge  7/8/2024  6:04 pm    Follow-up:  No follow-up provider specified.        Medications Prescribed:  Discharge Medication List as of 7/8/2024  6:04 PM        START taking these medications    Details   triamcinolone 0.1 % External Cream Apply topically 2 (two) times daily for 10 days., Normal, Disp-1 each, R-0      loratadine 10 MG Oral Tab Take 1 tablet (10 mg total) by mouth daily., Normal, Disp-30 tablet, R-0

## 2024-07-11 ENCOUNTER — OFFICE VISIT (OUTPATIENT)
Dept: PHYSICAL THERAPY | Age: 79
End: 2024-07-11
Attending: INTERNAL MEDICINE
Payer: MEDICARE

## 2024-07-11 PROCEDURE — 97140 MANUAL THERAPY 1/> REGIONS: CPT

## 2024-07-11 PROCEDURE — 97112 NEUROMUSCULAR REEDUCATION: CPT

## 2024-07-11 PROCEDURE — 97110 THERAPEUTIC EXERCISES: CPT

## 2024-07-11 NOTE — PROGRESS NOTES
Insurance Primary/Secondary: [facility] MEDICARE / N/A     # Auth Visits: 10 in poc          Diagnosis:   LBP         Referring Provider: Diya Honeycutt    Date of Evaluation:  6/13/2024  Precautions:  None Next MD visit: none scheduled  Available lumbar xrays are from 2011 ( DJD of lumbar facets, L3-4 disc narrowing).     Subjective:  Notes pain when he lifts  Was able to do a yoga class with minimal  R buttock pain. Not as bad at night . No  L calf numbness in past couple days.  . Continues to note bilateral buttock pain R worse than L, irritated by standing as well as sitting.  Also irritated by stairs and squats at times.   Pain:   0/10 currently R buttock only, spreads to R calf with lumbar flexion.  ( R buttock goes up to 5/10 at times)       Objective:   + TTP R piriformis/sciatic notch  + R posterior thigh pain with SLR at 60 deg with DF sensitizer   Pt responds to L rotation in flexion, this abolishes calf symptoms and decreases R buttock symptoms to 1/10.    Lateral flexion R irritates R lateral leg  Lumbar flex/ext: no reproduction of sxs    R hip abd= 4-5 with posterior thigh pain   L hip abd=  4/5        Assessment: Continues to function at a high level (78.5% on LEFS) and notes less irritability in R buttock pain. Noted relief from SKTC and encouraged patient to do this before getting up in the morning.  R buttock and posterior thigh pain remain irritable with  sitting, prolonged standing., stairs     Responds to rotation in flexion in supine. . Side lying L increased  R LBP/ buttock pain to 5/10.  R shoulder ER irritates pain with tension greater than YTB    Goals: (to be met in 10 visits)   Pt will improve transversus abdominis recruitment to perform proper isometric contraction without requiring verbal or tactile cuing to promote advancement of therex   Pt will demonstrate good understanding of proper posture and body mechanics to decrease pain and improve spinal safety   Improve bilateral hip  strength 1/2 grade to 4/5 to improve gait pattern, decrease Trendelenburg   Pt will report improved symptom centralization and absence of radicular symptoms for 3 consecutive days to improve function with ADL   Improve sitting posture to allow patient to sit 10 mins without radicular symptoms.   Pt will demonstrate improved core strength to be able to perform rolling in bed with <2/10 pain   Pt will be independent and compliant with comprehensive HEP to maintain progress achieved in PT     Plan:  Continue lumbar stabilization exercises, flexion bias. , 7/26  Date: 6/21/2024  TX#: 2/9 dao Date:   6/26/24              TX#: 3/9 Date:   6/28/2024               TX#: 4/9 Date:         7/5/2024         TX#: 5/9 Date: 7/11/2024   Tx#: 6/9   Exercise 30 mins  - walk 1/10 of mile  - Nustep 5 mins while reviewing abdominal brace in walking and turning in bed. Discussed car seat cushion/position, sleep position   exercise 30 mins  - walk 3/10 of mile  - lumbar ext in standing x 10, inc buttock, no worse  - lumbar flexion in standing x 10: peripheralize to calf, worse Exercise : 35 mins  Nustep: 5 mins   - discussed adaptation of car seat with pillows    - Shuttle L4 DLP x20 pain R buttocks  - DHR L3 x15 Exercise  35 mins  - march with ADIM x15  - supine triceps ext 6# x15  - supine ER bias piriformis stretch Rt/L   - Verbal review of pain free shoulder ER/IR:  R ER 2.5#  pain x10  R IR 2.5# pain free x10   - B row  7.5# x15   pain free   - supine triceps ext 8# x20  Discussed seated row on FC machine.    Exercise: 30 mins   Nustep warm up x7 mins   - discussed ADIM before lifting   - standing row with pulleys  x25  - Walk outs with 2.5#   (Increases R buttock pain to 5/10 )   - shoulder ext with YTB x20   NMR : 10 mins   Instructed on ADIM before lifting,doing stairs, or completing transfers in and OOB. Practiced during therex.        ADIM, hook lying march x15 R/L   Cable amanda retro walk 25# x 10, inc, worse R buttock  -  seated trunk rot x 10, no effect Self stretch into  R/L lumbar rotation 30 sec x3   ( tolerated R with min   irritation)   - supine piriformis stretches ER bias  30 sec x3   - seated piriformis stretches R/L 30 sec x3 Shuttle DLP L 4 x30  R/L   SLP L3 x20   - heel raises DHR L2  march with ADIM x15  - supine triceps ext 6# x15  - supine ER bias piriformis stretch Rt,  Lumbar rot stretch with IR of hip  30 sec x3    Bilateral supine piriformis stretches , fig 2 position . 5 mins ( relieves)  - LTR x 20, nil symptoms  -  Sit to stand x10( cues for ADIM)  Sit to stand x10 SKTC 30 sec x3  DKTC 30 sec x3      Manual 10 mins    stretch into R hip flexion, ER/IR ,   Guided neural glides,   Piriformis stretches      Manual 10 mins    stretch into R hip flexion, ER/IR ,   Guided neural glides,   Piriformis stretches  Manual 10 mins    stretch into R hip flexion, ER/IR ,   Guided neural glides,   Piriformis stretches    Hip add with ball x20   Hip abd with RTB x20  ( Irritating)   L1A  abdominal : difficult to control lumbar position   Hip add with ball x20   Hip abd with RTB x20  ( Irritating)   L1A  abdominal : difficult to control lumbar position  Row with yellow sport cord x15, cues for ADIM R stretch into supine hip IR/lumbar rotation  BLFO R/L: unable to achieve on R    Manual  15 mins   Prone CPA to lumbar spine GR II-III, 20 oscillations   - L side lying lumbar rotation Gr II-III  - STM to R lumbar paraspinals ans R piriformis   - Manual stretch into R hip flexion, ER/IR   Manual  13 mins  -  man L flex/rot mobs gr 3 x 1 min, dec B  - self flex/rot x 1 min, dec, B  - L side lying lumbar rotation Gr II-III  - STM to R lumbar paraspinals ans R piriformis Supine R knee extension : increases R posterior thigh discomfort : added ankle DF/PF x10  - L supine KE x10  , added  ankle DF/PF    L clam x15 ( aggravating)  Discussed strengthening UE ex in patients chair yoga program with 2# wts  L1 B abd x15 R/L    HEP: 2 position  piriformis stretches R/L .  Trial of hip abd with BTB ( stopped due to sciatic irritation)     6/21/2024  supine KE x10 R/L ,  supine march x15 R/L   6/26/24 self flex/rot mob  7/11/2024 SKTC, DKTC         LEFS Score: 77.5 % (6/13/2024 11:42 AM)  Post:  78.75 %    Charges: m (10') ex 2 (30') NMR 10 mins      Total Timed Treatment: 50 min  Total Treatment Time: 50  min

## 2024-07-19 LAB
ABSOLUTE BASOPHILS: 41 CELLS/UL (ref 0–200)
ABSOLUTE EOSINOPHILS: 60 CELLS/UL (ref 15–500)
ABSOLUTE LYMPHOCYTES: 1523 CELLS/UL (ref 850–3900)
ABSOLUTE MONOCYTES: 460 CELLS/UL (ref 200–950)
ABSOLUTE NEUTROPHILS: 2516 CELLS/UL (ref 1500–7800)
BASOPHILS: 0.9 %
EOSINOPHILS: 1.3 %
FERRITIN: 5 NG/ML (ref 24–380)
HEMATOCRIT: 39.5 % (ref 38.5–50)
HEMOGLOBIN: 12.5 G/DL (ref 13.2–17.1)
LYMPHOCYTES: 33.1 %
MCH: 28.6 PG (ref 27–33)
MCHC: 31.6 G/DL (ref 32–36)
MCV: 90.4 FL (ref 80–100)
MONOCYTES: 10 %
MPV: 9.8 FL (ref 7.5–12.5)
NEUTROPHILS: 54.7 %
PLATELET COUNT: 217 THOUSAND/UL (ref 140–400)
PSA, TOTAL: 5.47 NG/ML
RDW: 14 % (ref 11–15)
RED BLOOD CELL COUNT: 4.37 MILLION/UL (ref 4.2–5.8)
WHITE BLOOD CELL COUNT: 4.6 THOUSAND/UL (ref 3.8–10.8)

## 2024-07-26 ENCOUNTER — OFFICE VISIT (OUTPATIENT)
Dept: PHYSICAL THERAPY | Age: 79
End: 2024-07-26
Attending: INTERNAL MEDICINE
Payer: MEDICARE

## 2024-07-26 PROCEDURE — 97112 NEUROMUSCULAR REEDUCATION: CPT

## 2024-07-26 PROCEDURE — 97110 THERAPEUTIC EXERCISES: CPT

## 2024-07-26 NOTE — PROGRESS NOTES
Insurance Primary/Secondary: [facility] MEDICARE / N/A     # Auth Visits: 10 in poc          Diagnosis:   LBP         Referring Provider: Diya Honeycutt    Date of Evaluation:  6/13/2024  Precautions:  None Next MD visit: none scheduled  Available lumbar xrays are from 2011 ( DJD of lumbar facets, L3-4 disc narrowing).     Subjective: Pain is not as intense as it used to be.  Notes pain when he lifts  Was able to do a yoga class with minimal  R buttock pain. Pain is not interfering with his sleep. . Min R  calf symptoms  in past couple days.  Also irritated by stairs and squats at times with 3# wt in yoga   Pain:   0/10 currently R buttock only, spreads to R calf with lumbar flexion.  ( R buttock goes up to 5/10 at times)       Objective:   + TTP R piriformis/sciatic notch  + R posterior thigh pain with SLR at 60 deg with DF sensitizer   Pt responds to L rotation in flexion, this abolishes calf symptoms and decreases R buttock symptoms to 1/10.    Lateral flexion R irritates R lateral leg  Lumbar flex/ext: no reproduction of sxs    R hip abd= 4-5 with posterior thigh pain   L hip abd=  4/5        Assessment: Continues to function at a high level (78.5% on LEFS) and notes less irritability in R buttock pain.  Symptoms are improving.  Noted relief from SKTC and encouraged patient to do this before getting up in the morning.  R buttock and posterior thigh pain remain irritable with  sitting, prolonged standing., stairs     Responds to rotation in flexion in supine. . Side lying L increased  R LBP/ buttock pain to 5/10.  R shoulder ER irritates pain with tension greater than YTB    Goals: (to be met in 10 visits)   Pt will improve transversus abdominis recruitment to perform proper isometric contraction without requiring verbal or tactile cuing to promote advancement of therex   Pt will demonstrate good understanding of proper posture and body mechanics to decrease pain and improve spinal safety   Improve bilateral  hip strength 1/2 grade to 4/5 to improve gait pattern, decrease Trendelenburg   Pt will report improved symptom centralization and absence of radicular symptoms for 3 consecutive days to improve function with ADL   Improve sitting posture to allow patient to sit 10 mins without radicular symptoms.   Pt will demonstrate improved core strength to be able to perform rolling in bed with <2/10 pain   Pt will be independent and compliant with comprehensive HEP to maintain progress achieved in PT     Plan:  Continue lumbar stabilization exercises, flexion bias.  Cont 8/1 . Cancel 8/8  Date: 6/21/2024  TX#: 2/9 dao Date:   6/26/24              TX#: 3/9 Date:   6/28/2024               TX#: 4/9 Date:         7/5/2024         TX#: 5/9 Date: 7/11/2024   Tx#: 6/9 7/26/2024 7/9   Exercise 30 mins  - walk 1/10 of mile  - Nustep 5 mins while reviewing abdominal brace in walking and turning in bed. Discussed car seat cushion/position, sleep position   exercise 30 mins  - walk 3/10 of mile  - lumbar ext in standing x 10, inc buttock, no worse  - lumbar flexion in standing x 10: peripheralize to calf, worse Exercise : 35 mins  Nustep: 5 mins   - discussed adaptation of car seat with pillows    - Shuttle L4 DLP x20 pain R buttocks  - DHR L3 x15 Exercise  35 mins  - march with ADIM x15  - supine triceps ext 6# x15  - supine ER bias piriformis stretch Rt/L   - Verbal review of pain free shoulder ER/IR:  R ER 2.5#  pain x10  R IR 2.5# pain free x10   - B row  7.5# x15   pain free   - supine triceps ext 8# x20  Discussed seated row on FC machine.    Exercise: 30 mins   Nustep warm up x7 mins   - discussed ADIM before lifting   - standing row with pulleys  x25  - Walk outs with 2.5#   (Increases R buttock pain to 5/10 )   - shoulder ext with YTB x20   NMR : 10 mins   Instructed on ADIM before lifting,doing stairs, or completing transfers in and OOB. Practiced during therex.      Exercise: 30 mins   - reciprocate 24 stairs up/down   - walk  1/10 of mile   - sit to stand x10    - DKTC with ball with ADIM   - supine LTR R/L on ball   - SKTC 30 sec x3 R/L   - L supine stretch into hip IR 30 sec x3 for sx relief   - seated lumbar flexion with ball 3x10    ADIM, hook lying march x15 R/L   Cable amanda retro walk 25# x 10, inc, worse R buttock  - seated trunk rot x 10, no effect Self stretch into  R/L lumbar rotation 30 sec x3   ( tolerated R with min   irritation)   - supine piriformis stretches ER bias  30 sec x3   - seated piriformis stretches R/L 30 sec x3 Shuttle DLP L 4 x30  R/L   SLP L3 x20   - heel raises DHR L2  march with ADIM x15  - supine triceps ext 6# x15  - supine ER bias piriformis stretch Rt,  Lumbar rot stretch with IR of hip  30 sec x3 NMR with ADIM (15 mns)   - supine triceps ex 6# x15  - protraction 6# x15     - hip add with ball x10 (slight aggravation )      Bilateral supine piriformis stretches , fig 2 position . 5 mins ( relieves)  - LTR x 20, nil symptoms  -  Sit to stand x10( cues for ADIM)  Sit to stand x10 SKTC 30 sec x3  DKTC 30 sec x3  Manual: 5 mins  Stretch to piriformis, stretch into hip flexion ER,      Manual 10 mins    stretch into R hip flexion, ER/IR ,   Guided neural glides,   Piriformis stretches      Manual 10 mins    stretch into R hip flexion, ER/IR ,   Guided neural glides,   Piriformis stretches  Manual 10 mins    stretch into R hip flexion, ER/IR ,   Guided neural glides,   Piriformis stretches     Hip add with ball x20   Hip abd with RTB x20  ( Irritating)   L1A  abdominal : difficult to control lumbar position   Hip add with ball x20   Hip abd with RTB x20  ( Irritating)   L1A  abdominal : difficult to control lumbar position  Row with yellow sport cord x15, cues for ADIM R stretch into supine hip IR/lumbar rotation  BLFO R/L: unable to achieve on R     Manual  15 mins   Prone CPA to lumbar spine GR II-III, 20 oscillations   - L side lying lumbar rotation Gr II-III  - STM to R lumbar paraspinals ans R  piriformis   - Manual stretch into R hip flexion, ER/IR   Manual  13 mins  -  man L flex/rot mobs gr 3 x 1 min, dec B  - self flex/rot x 1 min, dec, B  - L side lying lumbar rotation Gr II-III  - STM to R lumbar paraspinals ans R piriformis Supine R knee extension : increases R posterior thigh discomfort : added ankle DF/PF x10  - L supine KE x10  , added  ankle DF/PF    L clam x15 ( aggravating)  Discussed strengthening UE ex in patients chair yoga program with 2# wts  L1 B abd x15 R/L     HEP: 2 position piriformis stretches R/L .  Trial of hip abd with BTB ( stopped due to sciatic irritation)     6/21/2024  supine KE x10 R/L ,  supine march x15 R/L   6/26/24 self flex/rot mob  7/11/2024 SKTC, [facility]HA         LEFS Score: 77.5 % (6/13/2024 11:42 AM)  Post:  78.75 %    Charges:) ex 2 (30') NMR 10 mins      Total Timed Treatment: 40 min  Total Treatment Time: 40  min

## 2024-08-01 ENCOUNTER — OFFICE VISIT (OUTPATIENT)
Dept: PHYSICAL THERAPY | Age: 79
End: 2024-08-01
Attending: INTERNAL MEDICINE
Payer: MEDICARE

## 2024-08-01 PROCEDURE — 97110 THERAPEUTIC EXERCISES: CPT

## 2024-08-01 PROCEDURE — 97140 MANUAL THERAPY 1/> REGIONS: CPT

## 2024-08-01 NOTE — PROGRESS NOTES
Insurance Primary/Secondary: [facility] MEDICARE / N/A     # Auth Visits: 10 in poc          Diagnosis:   LBP        Discharge Summary      Referring Provider: Diya Honeycutt    Date of Evaluation:  6/13/2024  Precautions:  None Next MD visit: none scheduled  Available lumbar xrays are from 2011 ( DJD of lumbar facets, L3-4 disc narrowing).     Subjective: Pain is not as intense as it used to be but he did have a lot of pain after prolonged sitting in the evening yesterday. Felt his hamstrings were so tight.  Tylenol helped.   Notes pain at times  when he lifts  Was able to do a yoga class with minimal  R buttock pain. Pain is not interfering with his sleep. . Min R  calf symptoms  in past couple days.  Also irritated by stairs and squats at times with 3# wt in yoga   Pain:   0/10 currently R buttock only, spreads to R calf with lumbar flexion.  ( R buttock goes up to 5/10 at times)       Objective:   + TTP R piriformis/sciatic notch  + R posterior thigh pain with SLR at 60 deg with DF sensitizer   Pt responds to L rotation in flexion, this abolishes calf symptoms and decreases R buttock symptoms to 1/10.    Lateral flexion R irritates R lateral leg  Lumbar flex/ext: no reproduction of sxs    R hip abd= 4-5 with posterior thigh pain   L hip abd=  4/5..  Gait: continues to amb with Trendelenburg gait.         Assessment:  Patient has met goals except for hip abd strength  remains 4/5 with + Trendelenburg gait. Continues to function at a high level (78.5% on LEFS) and notes less irritability in R buttock pain.  Symptoms are improving.    R buttock and posterior thigh pain remain irritable with  sitting, prolonged standing., stairs     Responds to rotation in flexion in supine. As patient is IND with HEP and has met goals, further PT is not indicated at this time.     Goals: (to be met in 10 visits)   Pt will improve transversus abdominis recruitment to perform proper isometric contraction without requiring verbal  or tactile cuing to promote advancement of therex  MET  Pt will demonstrate good understanding of proper posture and body mechanics to decrease pain and improve spinal safety  MET  Improve bilateral hip strength 1/2 grade to 4/5 to improve gait pattern, decrease Trendelenburg     R Hip abd= 4-/5   L=4/5  Pt will report improved symptom centralization and absence of radicular symptoms for 3 consecutive days to improve function with ADL  Improved  Improve sitting posture to allow patient to sit 10 mins without radicular symptoms. MET  Pt will demonstrate improved core strength to be able to perform rolling in bed with <2/10 pain  MET  Pt will be independent and compliant with comprehensive HEP to maintain progress achieved in PT MET    Plan:  Discharge PT .  Canceled last appt due to going out of town, goals met.   Date: 6/21/2024  TX#: 2/9 dao Date:   6/26/24              TX#: 3/9 Date:   6/28/2024               TX#: 4/9 Date:         7/5/2024         TX#: 5/9 Date: 7/11/2024   Tx#: 6/9 7/26/2024 7/9 8/1/2024   8/8    Exercise 30 mins  - walk 1/10 of mile  - Nustep 5 mins while reviewing abdominal brace in walking and turning in bed. Discussed car seat cushion/position, sleep position   exercise 30 mins  - walk 3/10 of mile  - lumbar ext in standing x 10, inc buttock, no worse  - lumbar flexion in standing x 10: peripheralize to calf, worse Exercise : 35 mins  Nustep: 5 mins   - discussed adaptation of car seat with pillows    - Shuttle L4 DLP x20 pain R buttocks  - DHR L3 x15 Exercise  35 mins  - march with ADIM x15  - supine triceps ext 6# x15  - supine ER bias piriformis stretch Rt/L   - Verbal review of pain free shoulder ER/IR:  R ER 2.5#  pain x10  R IR 2.5# pain free x10   - B row  7.5# x15   pain free   - supine triceps ext 8# x20  Discussed seated row on FC machine.    Exercise: 30 mins   Nustep warm up x7 mins   - discussed ADIM before lifting   - standing row with pulleys  x25  - Walk outs with 2.5#    (Increases R buttock pain to 5/10 )   - shoulder ext with YTB x20   NMR : 10 mins   Instructed on ADIM before lifting,doing stairs, or completing transfers in and OOB. Practiced during therex.      Exercise: 30 mins   - reciprocate 24 stairs up/down   - walk 1/10 of mile   - sit to stand x10    - DKTC with ball with ADIM   - supine LTR R/L on ball   - SKTC 30 sec x3 R/L   - R supine stretch into hip IR 30 sec x3 for sx relief   - seated lumbar flexion with ball 3x10  Exercise 30 mins  Nustep: 8 mins while discussing pain management strategies and body mechanics for travel.     Seated lumbar flexion with ball: fwd R/L   - supine and seated piriformis stretches 2 positions (R)    ADIM, hook lying march x15 R/L   Cable amanda retro walk 25# x 10, inc, worse R buttock  - seated trunk rot x 10, no effect Self stretch into  R/L lumbar rotation 30 sec x3   ( tolerated R with min   irritation)   - supine piriformis stretches ER bias  30 sec x3   - seated piriformis stretches R/L 30 sec x3 Shuttle DLP L 4 x30  R/L   SLP L3 x20   - heel raises DHR L2  march with ADIM x15  - supine triceps ext 6# x15  - supine ER bias piriformis stretch Rt,  Lumbar rot stretch with IR of hip  30 sec x3 NMR with ADIM (15 mns)   - supine triceps ex 6# x15  - protraction 6# x15     - hip add with ball x10 (slight aggravation )   Row with sport cord 2x10   Cues for ADIM  - standing HR x20  - vestibular board :   A/p and hold level     Bilateral supine piriformis stretches , fig 2 position . 5 mins ( relieves)  - LTR x 20, nil symptoms  -  Sit to stand x10( cues for ADIM)  Sit to stand x10 SKTC 30 sec x3  DKTC 30 sec x3  Manual: 5 mins  Stretch to piriformis, stretch into hip flexion ER,  Manual: 10 mins  Stretch to piriformis, stretch into hip flexion ER,  - stretch to hamstrings, ITB      Manual 10 mins    stretch into R hip flexion, ER/IR ,   Guided neural glides,   Piriformis stretches      Manual 10 mins    stretch into R hip flexion, ER/IR  ,   Guided neural glides,   Piriformis stretches  Manual 10 mins    stretch into R hip flexion, ER/IR ,   Guided neural glides,   Piriformis stretches      Hip add with ball x20   Hip abd with RTB x20  ( Irritating)   L1A  abdominal : difficult to control lumbar position   Hip add with ball x20   Hip abd with RTB x20  ( Irritating)   L1A  abdominal : difficult to control lumbar position  Row with yellow sport cord x15, cues for ADIM R stretch into supine hip IR/lumbar rotation  BLFO R/L: unable to achieve on R      Manual  15 mins   Prone CPA to lumbar spine GR II-III, 20 oscillations   - L side lying lumbar rotation Gr II-III  - STM to R lumbar paraspinals ans R piriformis   - Manual stretch into R hip flexion, ER/IR   Manual  13 mins  -  man L flex/rot mobs gr 3 x 1 min, dec B  - self flex/rot x 1 min, dec, B  - L side lying lumbar rotation Gr II-III  - STM to R lumbar paraspinals ans R piriformis Supine R knee extension : increases R posterior thigh discomfort : added ankle DF/PF x10  - L supine KE x10  , added  ankle DF/PF    L clam x15 ( aggravating)  Discussed strengthening UE ex in patients chair yoga program with 2# wts  L1 B abd x15 R/L      HEP: 2 position piriformis stretches R/L .  Trial of hip abd with BTB ( stopped due to sciatic irritation)     6/21/2024  supine KE x10 R/L ,  supine march x15 R/L   6/26/24 self flex/rot mob  7/11/2024 SKTC, DKTC         LEFS Score: 77.5 % (6/13/2024 11:42 AM)  Post:  78.75 %    Charges:) ex 2 (30')  m10 mins      Total Timed Treatment: 40 min  Total Treatment Time: 40  min

## 2024-08-08 ENCOUNTER — APPOINTMENT (OUTPATIENT)
Dept: PHYSICAL THERAPY | Age: 79
End: 2024-08-08
Attending: INTERNAL MEDICINE
Payer: MEDICARE

## 2024-09-05 ENCOUNTER — OFFICE VISIT (OUTPATIENT)
Dept: SURGERY | Facility: CLINIC | Age: 79
End: 2024-09-05

## 2024-09-05 DIAGNOSIS — N40.1 BPH WITH OBSTRUCTION/LOWER URINARY TRACT SYMPTOMS: ICD-10-CM

## 2024-09-05 DIAGNOSIS — R82.90 URINE FINDING: ICD-10-CM

## 2024-09-05 DIAGNOSIS — R97.20 ELEVATED PSA: Primary | ICD-10-CM

## 2024-09-05 DIAGNOSIS — N39.41 URGE INCONTINENCE: ICD-10-CM

## 2024-09-05 DIAGNOSIS — N13.8 BPH WITH OBSTRUCTION/LOWER URINARY TRACT SYMPTOMS: ICD-10-CM

## 2024-09-05 LAB
APPEARANCE: CLEAR
BILIRUBIN: NEGATIVE
GLUCOSE (URINE DIPSTICK): NEGATIVE MG/DL
KETONES (URINE DIPSTICK): NEGATIVE MG/DL
LEUKOCYTES: NEGATIVE
MULTISTIX LOT#: NORMAL NUMERIC
NITRITE, URINE: NEGATIVE
OCCULT BLOOD: NEGATIVE
PH, URINE: 5.5 (ref 4.5–8)
PROTEIN (URINE DIPSTICK): NEGATIVE MG/DL
SPECIFIC GRAVITY: 1.01 (ref 1–1.03)
URINE-COLOR: YELLOW
UROBILINOGEN,SEMI-QN: 0.2 MG/DL (ref 0–1.9)

## 2024-09-05 PROCEDURE — 1159F MED LIST DOCD IN RCRD: CPT | Performed by: PHYSICIAN ASSISTANT

## 2024-09-05 PROCEDURE — 51798 US URINE CAPACITY MEASURE: CPT | Performed by: PHYSICIAN ASSISTANT

## 2024-09-05 PROCEDURE — 99213 OFFICE O/P EST LOW 20 MIN: CPT | Performed by: PHYSICIAN ASSISTANT

## 2024-09-05 PROCEDURE — 81003 URINALYSIS AUTO W/O SCOPE: CPT | Performed by: PHYSICIAN ASSISTANT

## 2024-09-05 PROCEDURE — 1160F RVW MEDS BY RX/DR IN RCRD: CPT | Performed by: PHYSICIAN ASSISTANT

## 2024-09-05 NOTE — PROGRESS NOTES
Subjective:   Patient is a 79 year old male with hx of CAD, HTN, HL, nephrolithiasis, and BPH with LUTS who presents today for follow up.    Patient was seen last in January 2024 for follow up BPH, UUI, and elevated PSA. Reviewed PSA values were labile but within age appropriate range. He wishes to continue monitoring annually. Subjectively improved LUTS on tamsulosin, declined finasteride.     More recently developed some sciatica problems.      LUTS stable, daytime voiding unchanged q 3 hours, but nocturia x 1-2 and urine stream improved from prior to initiation of tamsulosin. UUI has resolved since initiation of tamsulosin. He denies any dysuria or gross hematuria. Occasional post void dribbling. Drinks volumes of water prior to bed.      Prior PSAs labile in the range of 4.5-6.88 since 2016. Patient had been seen previously for elevated PSA and had MRI 2017 completed that showed PIRADS2 grading and estimated volume 42g. We discussed consideration that no additional PSA would need to be checked, but he would like to monitor. Most recent PSA 5.47.    UA today negative  Remote history of stones, none recently. Last imaging 2013 that was negative stones.     Brother enlarged prostate s/p TURP - path negative.      PSA 7/18/24 5.47  PSA 7/20/23 4.79  PSA 8/5/22 4.51        Lab Results   Component Value Date/Time     PSA 6.88 (H) 10/15/2021 07:54 AM     PSA 4.84 (H) 09/11/2020 07:19 AM     PSA 4.77 (H) 03/18/2019 12:38 PM     PSA 5.81 (H) 12/20/2017 09:15 AM     PSA 5.990 (H) 05/23/2017 02:26 PM     PSA 4.550 (H) 08/03/2016 09:42 AM     PSA 5.440 (H) 02/03/2016 10:32 AM     PSA 2.91 01/18/2011 10:16 AM            Lab Results   Component Value Date     PSAS 4.31 (H) 05/20/2015        History/Other:    Chief Complaint Reviewed and Verified  Nursing Notes Reviewed and   Verified  Allergies Reviewed  Medications Reviewed         Current Outpatient Medications   Medication Sig Dispense Refill    ferrous sulfate 325 (65  FE) MG Oral Tab EC Take 1 tablet (325 mg total) by mouth daily with breakfast.      latanoprost 0.005 % Ophthalmic Solution Place 1 drop into both eyes nightly.      simvastatin 40 MG Oral Tab Take 1 tablet (40 mg total) by mouth nightly. 90 tablet 0    tamsulosin 0.4 MG Oral Cap Take 1 capsule (0.4 mg total) by mouth daily. Take 1/2 hour following the same meal each day 90 capsule 3    aspirin 81 MG Oral Tab Take 1 tablet (81 mg total) by mouth daily.         Review of Systems:  Pertinent items are noted in HPI.      Objective:   There were no vitals taken for this visit. Estimated body mass index is 31.62 kg/m² as calculated from the following:    Height as of 7/8/24: 5' 5\" (1.651 m).    Weight as of 7/8/24: 190 lb (86.2 kg).    No distress  Non labored respirations    Laboratory Data:  Lab Results   Component Value Date    WBC 4.6 07/18/2024    HGB 12.5 (L) 07/18/2024     07/18/2024     Lab Results   Component Value Date     03/04/2024    K 4.2 03/04/2024     03/04/2024    CO2 25 03/04/2024    BUN 20 03/04/2024    GLU 97 03/04/2024    GFRAA 86 10/15/2021    AST 22 03/04/2024    ALT 13 03/04/2024    TP 7.3 05/20/2015    ALB 3.6 05/20/2015    CA 9.1 03/04/2024       Urinalysis Results (last three years):  Recent Labs     10/15/21  1501 08/26/22  0957 12/01/22  1632 01/04/24  0920   SPECGRAVITY 1.015 <=1.005 >=1.030 1.025   PHURINE 7.5 5.5 6.0 5.5   NITRITE Negative Negative Negative Negative       Urine Culture Results (last three years):  No results found for: \"URINECUL\"    Imaging  No results found.    Assessment & Plan:   BPH with incomplete emptying  Urinary urge incontinence  Improved subjectively and residual improved on alpha blocker  Patient tolerating well  Option of finasteride discussed again, declined at this time.  UA negative, residual at last check minimal - recheck today  Recommend continuation of Tamsulosin 0.4mg nightly.    Elevated PSA  Labile and within range for age -  reviewed in detail with patient  No prior prostate biopsy  MRI 2017 PI RADS 2  Patient wishes to continue annual checks with PCP.    Follow up in 1 year or sooner, prn.      Modesta Gan PA-C, 9/5/2024,

## 2024-10-17 ENCOUNTER — OFFICE VISIT (OUTPATIENT)
Dept: PAIN CLINIC | Facility: CLINIC | Age: 79
End: 2024-10-17
Payer: COMMERCIAL

## 2024-10-17 ENCOUNTER — HOSPITAL ENCOUNTER (OUTPATIENT)
Dept: GENERAL RADIOLOGY | Facility: HOSPITAL | Age: 79
Discharge: HOME | End: 2024-10-17
Attending: PHYSICIAN ASSISTANT
Payer: MEDICARE

## 2024-10-17 VITALS
WEIGHT: 182 LBS | BODY MASS INDEX: 30 KG/M2 | DIASTOLIC BLOOD PRESSURE: 78 MMHG | SYSTOLIC BLOOD PRESSURE: 140 MMHG | HEART RATE: 60 BPM | OXYGEN SATURATION: 99 %

## 2024-10-17 DIAGNOSIS — M54.16 LUMBAR RADICULOPATHY: ICD-10-CM

## 2024-10-17 DIAGNOSIS — M47.816 LUMBAR SPONDYLOSIS: Primary | ICD-10-CM

## 2024-10-17 DIAGNOSIS — M47.816 LUMBAR SPONDYLOSIS: ICD-10-CM

## 2024-10-17 PROCEDURE — 1160F RVW MEDS BY RX/DR IN RCRD: CPT | Performed by: PHYSICIAN ASSISTANT

## 2024-10-17 PROCEDURE — 72114 X-RAY EXAM L-S SPINE BENDING: CPT | Performed by: PHYSICIAN ASSISTANT

## 2024-10-17 PROCEDURE — 99204 OFFICE O/P NEW MOD 45 MIN: CPT | Performed by: PHYSICIAN ASSISTANT

## 2024-10-17 PROCEDURE — 3077F SYST BP >= 140 MM HG: CPT | Performed by: PHYSICIAN ASSISTANT

## 2024-10-17 PROCEDURE — 1159F MED LIST DOCD IN RCRD: CPT | Performed by: PHYSICIAN ASSISTANT

## 2024-10-17 PROCEDURE — 3078F DIAST BP <80 MM HG: CPT | Performed by: PHYSICIAN ASSISTANT

## 2024-10-17 NOTE — PATIENT INSTRUCTIONS
Refill policies:    Allow 2-3 business days for refills; controlled substances may take longer.  Contact your pharmacy at least 5 days prior to running out of medication and have them send an electronic request or submit request through the “request refill” option in your [employer] account.  Refills are not addressed on weekends; covering physicians do not authorize routine medications on weekends.  No narcotics or controlled substances are refilled after noon on Fridays or by on call physicians.  By law, narcotics must be electronically prescribed.  A 30 day supply with no refills is the maximum allowed.  If your prescription is due for a refill, you may be due for a follow up appointment.  To best provide you care, patients receiving routine medications need to be seen at least once a year.  Patients receiving narcotic/controlled substance medications need to be seen at least once every 3 months.  In the event that your preferred pharmacy does not have the requested medication in stock (e.g. Backordered), it is your responsibility to find another pharmacy that has the requested medication available.  We will gladly send a new prescription to that pharmacy at your request.    Scheduling Tests:    If your physician has ordered radiology tests such as MRI or CT scans, please contact Central Scheduling at [phone] right away to schedule the test.  Once scheduled, the [facility] Centralized Referral Team will work with your insurance carrier to obtain pre-certification or prior authorization.  Depending on your insurance carrier, approval may take 3-10 days.  It is highly recommended patients assure they have received an authorization before having a test performed.  If test is done without insurance authorization, patient may be responsible for the entire amount billed.      Precertification and Prior Authorizations:  If your physician has recommended that you have a procedure or additional testing performed the [facility]  Centralized Referral Team will contact your insurance carrier to obtain pre-certification or prior authorization.    You are strongly encouraged to contact your insurance carrier to verify that your procedure/test has been approved and is a COVERED benefit.  Although the [facility] Centralized Referral Team does its due diligence, the insurance carrier gives the disclaimer that \"Although the procedure is authorized, this does not guarantee payment.\"    Ultimately the patient is responsible for payment.   Thank you for your understanding in this matter.  Paperwork Completion:  If you require FMLA or disability paperwork for your recovery, please make sure to either drop it off or have it faxed to our office at [phone]. Be sure the form has your name and date of birth on it.  The form will be faxed to our Forms Department and they will complete it for you.  There is a 25$ fee for all forms that need to be filled out.  Please be aware there is a 10-14 day turnaround time.  You will need to sign a release of information (CANDICE) form if your paperwork does not come with one.  You may call the Forms Department with any questions at [phone].  Their fax number is [phone].

## 2024-10-17 NOTE — PROGRESS NOTES
Patient: Dameon El  Medical Record Number: [id number]  Site: [facility]  Referring Physician:  Diya Honeycutt  PCP: same    Dear Dr. Honeycutt:    Thank you very much for requesting this consultation. I had the opportunity to evaluate and initiate care for your patient today, as per your request.    HISTORY OF CHIEF COMPLAINT:      Dameon El is a 79 year old male, who complains of low back pain with L posterior/lateral LE pain to great toe with n/t in L LE.    Patient is here today at the request of his primary care physician with pain in above-described distribution that began atraumatically years ago.  Initially, pain was low grade and intermittent, and was very tolerable.  Over the past year, it has become more frequent and intense, and hs impacted his walking tolerance.  He did discuss this with his primary physician in June 2024, and was sent to physical therapy.  After 2 months of therapy, pain continued, and has been unresponsive to NSAIDs and tylenol.  Has tried various topical agents, again, to no relief.  Sent for further options.    VAS Pain Score:  3-9/10    Aggravating Factors: Relieving Factors:   Standing  Walking   Sitting in one position for prolonged time  Changing positions      Past Treatment Attempted/Patient’s Response:  As above     Past Medical History:    Agent orange exposure    BPH (benign prostatic hyperplasia)    Calculus of kidney    Cataract    CORONARY ARTERY DISEASE    MYOCARDIAL INFARCTION w/o intervention    Coronary atherosclerosis    Dermatophytosis of nail    onychomycosis    Diverticulosis    Glucose intolerance (pre-diabetes)    Heart attack (HCC)    High cholesterol    Other and unspecified hyperlipidemia    Unspecified essential hypertension    Visual impairment    glasses      Past Surgical History:   Procedure Laterality Date    Appendectomy  as a child    Colonoscopy  05/08/2007    NL    Colonoscopy N/A 5/13/2016    Procedure: COLONOSCOPY;  Surgeon:  David Coyle MD;  Location:  ENDOSCOPY    Colonoscopy N/A 10/20/2020    Procedure: COLONOSCOPY;  Surgeon: Fracisco Berkowitz MD;  Location:  ENDOSCOPY    Other surgical history  1960    PILONIDAL CYST      Family History   Problem Relation Age of Onset    Diabetes Mother     Cancer Neg       Social History     Socioeconomic History    Marital status: Single   Tobacco Use    Smoking status: Never    Smokeless tobacco: Never   Vaping Use    Vaping status: Never Used   Substance and Sexual Activity    Alcohol use: Yes     Comment: 2 drinks/week    Drug use: No   Other Topics Concern    Caffeine Concern Yes     Comment: 2 cups of coffee daily.     Exercise Yes     Comment: 2x/week.      Current Medications:  Current Outpatient Medications   Medication Sig Dispense Refill    ferrous sulfate 325 (65 FE) MG Oral Tab EC Take 1 tablet (325 mg total) by mouth daily with breakfast.      latanoprost 0.005 % Ophthalmic Solution Place 1 drop into both eyes nightly.      simvastatin 40 MG Oral Tab Take 1 tablet (40 mg total) by mouth nightly. 90 tablet 0    tamsulosin 0.4 MG Oral Cap Take 1 capsule (0.4 mg total) by mouth daily. Take 1/2 hour following the same meal each day 90 capsule 3    aspirin 81 MG Oral Tab Take 1 tablet (81 mg total) by mouth daily.          Functional Assessment: Patient reports that they are able to complete all of their ADL's such as eating, bathing, using the toilet, dressing and getting up from a bed or a chair independently.    Work History:  The patient currently works part time as .      REVIEW OF SYSTEMS:   10 point review of systems is otherwise negative,unless otherwise in HPI.      Radiology/Lab Test Reviewed:  no lumbar imaging    CBC:    Lab Results   Component Value Date    WBC 4.6 07/18/2024    WBC 4.7 03/04/2024    WBC 4.7 08/05/2022     Lab Results   Component Value Date    HEMOGLOBIN 12.4 (L) 07/08/2024     Lab Results   Component Value Date     07/18/2024    PLT  246 03/04/2024     08/05/2022       PHYSICAL EXAMIMATION   PHYSICAL EXAMINATION: Dameon El is a 79 year old male who is observed sitting comfortably on a chair in the exam room alert and oriented times three. He looks consistent with his stated age.    Ht Readings from Last 1 Encounters:   07/08/24 65\"     Wt Readings from Last 1 Encounters:   07/08/24 190 lb (86.2 kg)     The patient is well developed, well nourished, normal body habitus, well muscled. He moves independently from sitting to standing with ease.       Inspection:  No acute distress    Patient displays Antalgic gait.    Coordination:  Well-coordinated, fluent gait, able to engage in rapid alternating movements of upper and lower extremities.    ROM Lumbar Spine:  See chart below:  Motion Right (+ or -) Left (+ or -)   Lumbar Flexion - +   Lumbar Extension - +   Lumbar Bending - -   Lumbar Extension/ twisting motion - -     Lumbar/Sacral Integument:  Skin over lumbar sacral spine is intact without rashes, excoriations, lesions or erythema noted    Palpation:  See chart below:  Palpation of lumbar area Right (+ or -) Left (+ or -)   Lumbar facets - -   Lumbar paraspinals - -   Piriformis - -   SIJ - -   Trochanteric Bursa - -     Strength:  Strength deficits noted:  diminished str L EHL (4/5), 5/5 otherwise      Sensation:  No sensory deficits noted on bilateral lower extremities to light touch    Tests:  Test Right (+ or -) Left (+ or -)   SLR - +   Jomar’s     Babinski     Clonus       HEAD/NECK: Head is normocephalic, neck supple  EYES: EOMI, ARACELI  SKIN EXAM: Skin is intact, head, neck, trunk and arms/legs. No rashes, mottling or ulcerations.  LYMPH EXAM: There is no lymph edema in either lower extremity.  VASCULAR EXAM: Pedal pulses are normal bilaterally, with good distal perfusion. No clubbing or cyanosis.  ABDOMINAL EXAM: Abdomen is soft without masses palpated.  HEART: normal, regular, S1 and S2  LUNGS:CTA  MUSCULOSKELETAL:  Smooth, pain-free ROM to bilat hips, ankles, and knees.     Do you have any known blood/bleeding disorders?  No  Does patient currently take blood thinners?   None  Does patient currently take any antibiotics?   No      Patient is currently on pain medications:  No  Reason pain medications are prescribed: N/A  Pain medications are prescribed by: N/A  [place] Prescription Monitoring review: N/A  DIRE: N/A  Treatment decision: N/A    MEDICAL DECISION MAKING:     Impression: Lumbar spondylosis, left lumbar radiculopathy    Chronic low back and radiating left lower extremity pain, significantly progressed over the past year.  This has limited his standing and walking tolerance, though at rest he is relatively comfortable.  Has been to physical therapy for the past 2 months, to limited relief.  Aggressive with HEP, again, to no significant sustained improvement.  NSAIDs and Tylenol ineffective.  On exam, does have pain with range of motion lumbar spine, weakness of the left EHL (4/5).  Positive left straight leg raise.    Will asked that he obtain x-rays of the lumbar spine with flexion and extension views.  In addition, order placed for MRI of the lumbar spine.  Follow-up after imaging for discussion of plan of care.    Plan: X-ray L-spine with flex ex views.  MRI lumbar spine.  Follow-up after imaging.    The patient indicates understanding of these issues and agrees to the plan.      Thank you very much.     Respectfully yours,    JOLENE Galvan

## 2024-10-17 NOTE — PROGRESS NOTES
Subjective:   Patient ID: Dameon El is a 79 year old male.    HPI    History/Other:   Review of Systems  Current Outpatient Medications   Medication Sig Dispense Refill   • ferrous sulfate 325 (65 FE) MG Oral Tab EC Take 1 tablet (325 mg total) by mouth daily with breakfast.     • latanoprost 0.005 % Ophthalmic Solution Place 1 drop into both eyes nightly.     • simvastatin 40 MG Oral Tab Take 1 tablet (40 mg total) by mouth nightly. 90 tablet 0   • tamsulosin 0.4 MG Oral Cap Take 1 capsule (0.4 mg total) by mouth daily. Take 1/2 hour following the same meal each day 90 capsule 3   • aspirin 81 MG Oral Tab Take 1 tablet (81 mg total) by mouth daily.       Allergies:Allergies[1]    Objective:   Physical Exam  Constitutional:          Assessment & Plan:   No diagnosis found.    No orders of the defined types were placed in this encounter.      Meds This Visit:  Requested Prescriptions      No prescriptions requested or ordered in this encounter       Imaging & Referrals:  None      Location of Pain: low back with radiculopathy    Date Pain Began: about 1 year          Work Related:   No        Receiving Work Comp/Disability:   No    Numeric Rating Scale:  Pain at Present:  5                                                                                                            (No Pain) 0  to  10 (Worst Pain)                 Minimum Pain:   3  Maximum Pain  9    Distribution of Pain:    bilateral    Quality of Pain:   aching, numbness, and tingling    Origin of Pain:    Degenerative    Aggravating Factors:    Sitting, Standing, and Walking    Past Treatments for Current Pain Condition:   Physical Therapy    Prior diagnostic testing for your pain:  NA

## 2024-10-29 ENCOUNTER — PATIENT MESSAGE (OUTPATIENT)
Dept: PAIN CLINIC | Facility: CLINIC | Age: 79
End: 2024-10-29

## 2024-11-15 ENCOUNTER — OFFICE VISIT (OUTPATIENT)
Dept: INTERNAL MEDICINE CLINIC | Facility: CLINIC | Age: 79
End: 2024-11-15
Payer: COMMERCIAL

## 2024-11-15 VITALS
HEART RATE: 84 BPM | SYSTOLIC BLOOD PRESSURE: 120 MMHG | WEIGHT: 184.19 LBS | RESPIRATION RATE: 16 BRPM | DIASTOLIC BLOOD PRESSURE: 70 MMHG | TEMPERATURE: 98 F | BODY MASS INDEX: 31 KG/M2 | OXYGEN SATURATION: 96 %

## 2024-11-15 DIAGNOSIS — I25.10 CORONARY ARTERY DISEASE INVOLVING NATIVE CORONARY ARTERY OF NATIVE HEART WITHOUT ANGINA PECTORIS: ICD-10-CM

## 2024-11-15 DIAGNOSIS — E66.09 CLASS 1 OBESITY DUE TO EXCESS CALORIES WITH SERIOUS COMORBIDITY AND BODY MASS INDEX (BMI) OF 30.0 TO 30.9 IN ADULT: ICD-10-CM

## 2024-11-15 DIAGNOSIS — R73.01 IMPAIRED FASTING GLUCOSE: ICD-10-CM

## 2024-11-15 DIAGNOSIS — M46.96 UNSPECIFIED INFLAMMATORY SPONDYLOPATHY, LUMBAR REGION (HCC): ICD-10-CM

## 2024-11-15 DIAGNOSIS — R97.20 ELEVATED PSA: ICD-10-CM

## 2024-11-15 DIAGNOSIS — E78.2 MIXED HYPERLIPIDEMIA: Primary | ICD-10-CM

## 2024-11-15 DIAGNOSIS — E66.811 CLASS 1 OBESITY DUE TO EXCESS CALORIES WITH SERIOUS COMORBIDITY AND BODY MASS INDEX (BMI) OF 30.0 TO 30.9 IN ADULT: ICD-10-CM

## 2024-11-15 DIAGNOSIS — M54.16 ACUTE LEFT LUMBAR RADICULOPATHY: ICD-10-CM

## 2024-11-15 DIAGNOSIS — Z00.00 ENCOUNTER FOR ANNUAL HEALTH EXAMINATION: ICD-10-CM

## 2024-11-15 PROCEDURE — G2211 COMPLEX E/M VISIT ADD ON: HCPCS | Performed by: INTERNAL MEDICINE

## 2024-11-15 PROCEDURE — 99214 OFFICE O/P EST MOD 30 MIN: CPT | Performed by: INTERNAL MEDICINE

## 2024-11-15 PROCEDURE — 1160F RVW MEDS BY RX/DR IN RCRD: CPT | Performed by: INTERNAL MEDICINE

## 2024-11-15 PROCEDURE — 1159F MED LIST DOCD IN RCRD: CPT | Performed by: INTERNAL MEDICINE

## 2024-11-15 PROCEDURE — 3074F SYST BP LT 130 MM HG: CPT | Performed by: INTERNAL MEDICINE

## 2024-11-15 PROCEDURE — 3078F DIAST BP <80 MM HG: CPT | Performed by: INTERNAL MEDICINE

## 2024-11-15 RX ORDER — LIDOCAINE 50 MG/G
PATCH TOPICAL
COMMUNITY
Start: 2024-10-24

## 2024-11-15 RX ORDER — GABAPENTIN 100 MG/1
100 CAPSULE ORAL NIGHTLY
Qty: 60 CAPSULE | Refills: 1 | Status: SHIPPED | OUTPATIENT
Start: 2024-11-15

## 2024-11-15 RX ORDER — SIMVASTATIN 40 MG
40 TABLET ORAL NIGHTLY
Qty: 90 TABLET | Refills: 3 | Status: SHIPPED | OUTPATIENT
Start: 2024-11-15

## 2024-11-15 RX ORDER — CYCLOBENZAPRINE HCL 10 MG
10 TABLET ORAL
COMMUNITY
Start: 2024-10-24

## 2024-11-15 NOTE — PROGRESS NOTES
Dameon El is a 79 year old male.     HPI:   Patient presents for the following issues. Comes in with partner Nanci.   Low back pain - experiencing numbness in LLE, particularly if he is too active. He still has a part-time job. Sitting exacerbates pain. Pain is a bit better. Sleep is a bit less interrupted. He is getting 4-5 hours of sleep and wakes up to urinate and then falls back asleep easily. He is needing alleve a few times/week. Using flexeril most nights and it is helping a bit. He has completed PT but [facility] He thought PT was a bit helpful. He is also practicing HEP  Rising PSA - within range for his age. Seen by urology recently   Iron deficiency - taking iron daily.   HLP - having issues with statin refill.   CALVILLO - particularly when doing yardwork. Started a few months ago. Has to rest for 10-15 minutes for symptoms to resolve. No associated chest pain or pressure. Symptoms are very mild. He has not noticed these symptoms with indoor activities.       REVIEW OF SYSTEMS:   GENERAL HEALTH: feels well otherwise. No f/c  NEURO: denies any headaches, LH, dizzyness, LOC, falls  VISION: denies any blurred or double vision  RESPIRATORY: denies shortness of breath, cough, or congestion  CARDIOVASCULAR: denies chest pain, pressure or palpitations  GI: denies abdominal pain, constipation, diarrhea, n/v, BRBPR, melena  : no dysuria or hematuria   PSYCH: mood is stable. Denies depression or anxiety. Back pain is frustrating and limiting.   EXT: denies edema     Wt Readings from Last 6 Encounters:   10/17/24 182 lb (82.6 kg)   07/08/24 190 lb (86.2 kg)   06/05/24 195 lb 9.6 oz (88.7 kg)   03/04/24 192 lb 6.4 oz (87.3 kg)   07/29/22 212 lb 12.8 oz (96.5 kg)   10/15/21 207 lb 12.8 oz (94.3 kg)       Allergies   Allergen Reactions    Lipitor [Atorvastatin Calcium] MYALGIA     TABS    Pcn [Penicillins] HIVES and RASH       Family History   Problem Relation Age of Onset    Diabetes Mother      Cancer Neg       Past Medical History:    Agent orange exposure    BPH (benign prostatic hyperplasia)    Calculus of kidney    Cataract    CORONARY ARTERY DISEASE    MYOCARDIAL INFARCTION w/o intervention    Coronary atherosclerosis    Dermatophytosis of nail    onychomycosis    Diverticulosis    Glucose intolerance (pre-diabetes)    Heart attack (HCC)    High cholesterol    Other and unspecified hyperlipidemia    Unspecified essential hypertension    Visual impairment    glasses      Past Surgical History:   Procedure Laterality Date    Appendectomy  as a child    Colonoscopy  05/08/2007    NL    Colonoscopy N/A 5/13/2016    Procedure: COLONOSCOPY;  Surgeon: David Coyle MD;  Location:  ENDOSCOPY    Colonoscopy N/A 10/20/2020    Procedure: COLONOSCOPY;  Surgeon: Fracisco Berkowitz MD;  Location:  ENDOSCOPY    Other surgical history  1960    PILONIDAL CYST      Social History:    Social History     Socioeconomic History    Marital status: Single   Tobacco Use    Smoking status: Never    Smokeless tobacco: Never   Vaping Use    Vaping status: Never Used   Substance and Sexual Activity    Alcohol use: Yes     Comment: 2 drinks/week    Drug use: No   Other Topics Concern    Caffeine Concern Yes     Comment: 2 cups of coffee daily.     Exercise Yes     Comment: 2x/week.           EXAM:   /70 (BP Location: Left arm, Patient Position: Sitting, Cuff Size: adult)   Pulse 84   Temp 98.2 °F (36.8 °C) (Temporal)   Resp 16   Wt 184 lb 3.2 oz (83.6 kg)   SpO2 96%   BMI 30.65 kg/m²   GENERAL: A&O well developed, well nourished,in no apparent distress  SKIN: no rashes,no suspicious lesions  HEENT: atraumatic, MMM, throat is clear  NECK: supple, no jvd, no thyromegaly  LUNGS: clear to auscultation bilateraly, no c/w/r  CARDIO: RRR without g/m/r  GI: soft non tender nondistended no hsm bs throughout  NEURO: CN 2-12 grossly intact  PSYCH: pleasant  EXTREMITIES: no cyanosis, clubbing or edema      ASSESSMENT AND  PLAN:   # Lumbar Radiculopathy, Unspecified Inflammatory Spondylopathy of lumbar region:: symptoms are affecting his quality of life.  We are going to start gabapentin, hold flexeril, and discussed safe use of nsaids. He is already following with pain management and attending PT.   # CALVILLO - very mild. I discussed which symptoms need to prompt further evaluation.   # BPH and Elevated PSA: symptoms are well controlled on flomax. PSA normal range for age.   # Iron Deficiency Anemia: he is still donating blood a few times/year. Cont oral iron replacement.   - normal EGD and colonoscopy by Hayes Berkowitz in 10/2020.   # Obesity, BMI 30 and impaired fasting glucose: applauded on continued weight loss.  Counseled again on healthy plant based nutrition, regular exercise, and practicing mindfulness. We outlined small, achievable goals.   # CADz: states he had an MI in 2005 but cadiac cath showed mild disease so no stent was placed. Normal stress test in 5/2014. Follow for any symptoms. Cont daily ASA 81 mg  # HLP: at goal on simvastatin   # Health Maintenance: medicare supervisit on 3/4/2024   Stress Management: feels he deals with stress well  Colon Cancer Screening: colonoscopy by Dr. Hayes Berkowitz in 10/20/2020 was normal. No further screening is advised.   Bone Health: educated on dietary calcium/vit D, weight bearing exercises and fall prevention.   Prostate Cancer Screening: see above  Vaccines: up to date with shingles, pneumovac 23, and prevnar 13.  TDAP 2023.  Advised on RSV, flu and COVID.   Living Will, Medical POA:   Daughter Carley Chandler would be main contact. Counseled again on making living will.    Goals of Care: Full code but no prolonged life support    [facility]: cannot recall name.      The patient indicates understanding of these issues and agrees to the plan.  The patient is asked to return in 3 months for RAJEEV Honeycutt MD

## 2024-11-15 NOTE — PATIENT INSTRUCTIONS
Please take gabapentin as follows:  Week 1: 100 mg nightly (one tablet)  Week 2: 200 mg (two tablets) nightly  Week 3: 300 mg (three tablets) nightly for maintenance and please send me an update on your symptoms in 6 weeks.     Please hold the flexeril for now.       You can use 400-800 mg of ibuprofen 3-4 times daily. You should not exceed 3200 mg in 24 hours. ALWAYS take with food. Do not continue this high dosing for more than 2-4 weeks as ibuprofen products can cause stomach ulcers, worsen heartburn, affect the kidneys and thin the blood. If you need the alleve or ibuprofen-like products longer than 4 weeks on a daily basis then we can add in a medication to prevent stomach ulcers.      You can complete your labs as soon as possible.     Please resume your Aspirin 81 mg daily     I recommend the following vaccines -  RSV vaccine for everyone over age 75 and those ages 60-74 with chronic heart, lung, kidney and liver conditions.     Annual FLU every September, October.    Stay up to date with COVID vaccines.

## 2024-12-11 LAB
ALT: 12 U/L (ref 9–46)
AST: 26 U/L (ref 10–35)
BUN: 16 MG/DL (ref 7–25)
CALCIUM: 9.3 MG/DL (ref 8.6–10.3)
CARBON DIOXIDE: 29 MMOL/L (ref 20–32)
CHLORIDE: 103 MMOL/L (ref 98–110)
CHOL/HDLC RATIO: 1.9 (CALC)
CHOLESTEROL, TOTAL: 172 MG/DL
CREATININE: 0.97 MG/DL (ref 0.7–1.22)
EGFR: 79 ML/MIN/1.73M2
GLUCOSE: 103 MG/DL (ref 65–99)
HDL CHOLESTEROL: 89 MG/DL
HEMOGLOBIN A1C: 5.9 % OF TOTAL HGB
LDL-CHOLESTEROL: 68 MG/DL (CALC)
NON-HDL CHOLESTEROL: 83 MG/DL (CALC)
POTASSIUM: 4.8 MMOL/L (ref 3.5–5.3)
SODIUM: 137 MMOL/L (ref 135–146)
TRIGLYCERIDES: 72 MG/DL

## 2025-01-03 ENCOUNTER — TELEPHONE (OUTPATIENT)
Dept: INTERNAL MEDICINE CLINIC | Facility: CLINIC | Age: 80
End: 2025-01-03

## 2025-01-03 NOTE — TELEPHONE ENCOUNTER
Pt requesting an urgent refill for 'gabapentin 100mg' since he has 2 pills left.    gabapentin 100 MG Oral Cap 60 capsule

## 2025-01-04 NOTE — TELEPHONE ENCOUNTER
Happy to refill the gabapentin. What dose is he taking? I started him on 100 mg and told him he could increase to 300 mg nightly pending symptom response.

## 2025-01-04 NOTE — TELEPHONE ENCOUNTER
Left detailed voicemail on patient primary number to call back w/ dosage confirmation per KS message below.

## 2025-01-06 NOTE — TELEPHONE ENCOUNTER
Pt returning the call regarding his med. Pt is currently taking 300 mg and says it has been helping. Pt wants to know if KS would like to go higher or same?

## 2025-01-06 NOTE — TELEPHONE ENCOUNTER
PROTOCOL PASSED     LOV: 11/15/24   RTC: 3 months  Filled: 11/15/24 #60 1 refill   No future appointments.

## 2025-01-07 RX ORDER — GABAPENTIN 300 MG/1
300 CAPSULE ORAL NIGHTLY
Qty: 90 CAPSULE | Refills: 3 | Status: SHIPPED | OUTPATIENT
Start: 2025-01-07

## 2025-04-04 RX ORDER — TAMSULOSIN HYDROCHLORIDE 0.4 MG/1
0.4 CAPSULE ORAL
Qty: 90 CAPSULE | Refills: 3 | Status: SHIPPED | OUTPATIENT
Start: 2025-04-04

## 2025-07-15 DIAGNOSIS — Z00.00 ENCOUNTER FOR ANNUAL HEALTH EXAMINATION: ICD-10-CM

## 2025-07-15 DIAGNOSIS — R97.20 ELEVATED PSA: ICD-10-CM

## 2025-07-15 DIAGNOSIS — E78.2 MIXED HYPERLIPIDEMIA: ICD-10-CM

## 2025-07-15 DIAGNOSIS — I25.10 CORONARY ARTERY DISEASE INVOLVING NATIVE CORONARY ARTERY OF NATIVE HEART WITHOUT ANGINA PECTORIS: ICD-10-CM

## 2025-07-15 DIAGNOSIS — R73.01 IMPAIRED FASTING GLUCOSE: ICD-10-CM

## 2025-07-15 DIAGNOSIS — E61.1 IRON DEFICIENCY: Primary | ICD-10-CM

## 2025-07-15 RX ORDER — SIMVASTATIN 40 MG
40 TABLET ORAL NIGHTLY
Qty: 90 TABLET | Refills: 0 | OUTPATIENT
Start: 2025-07-15

## 2025-07-15 NOTE — TELEPHONE ENCOUNTER
Protocol: Passed  Last Office Visit: 11/15/24   Labs: Completed in December  Last Refill: 11/15/24 #90 3 Refills  Return to Clinic: No future appointments.    Requested medication too early.

## 2025-07-16 RX ORDER — SIMVASTATIN 40 MG
40 TABLET ORAL NIGHTLY
Qty: 90 TABLET | Refills: 0 | Status: SHIPPED | OUTPATIENT
Start: 2025-07-16

## 2025-07-20 LAB
ABSOLUTE BASOPHILS: 30 CELLS/UL (ref 0–200)
ABSOLUTE EOSINOPHILS: 39 CELLS/UL (ref 15–500)
ABSOLUTE LYMPHOCYTES: 1415 CELLS/UL (ref 850–3900)
ABSOLUTE MONOCYTES: 413 CELLS/UL (ref 200–950)
ABSOLUTE NEUTROPHILS: 2404 CELLS/UL (ref 1500–7800)
ALT: 15 U/L (ref 9–46)
AST: 28 U/L (ref 10–35)
BASOPHILS: 0.7 %
BUN: 17 MG/DL (ref 7–25)
CALCIUM: 9.1 MG/DL (ref 8.6–10.3)
CARBON DIOXIDE: 25 MMOL/L (ref 20–32)
CHLORIDE: 105 MMOL/L (ref 98–110)
CREATININE: 0.99 MG/DL (ref 0.7–1.22)
EGFR: 77 ML/MIN/1.73M2
EOSINOPHILS: 0.9 %
FERRITIN: 18 NG/ML (ref 24–380)
GLUCOSE: 88 MG/DL (ref 65–139)
HEMATOCRIT: 43 % (ref 38.5–50)
HEMOGLOBIN A1C: 5.7 %
HEMOGLOBIN: 14.1 G/DL (ref 13.2–17.1)
LYMPHOCYTES: 32.9 %
MCH: 31.7 PG (ref 27–33)
MCHC: 32.8 G/DL (ref 32–36)
MCV: 96.6 FL (ref 80–100)
MONOCYTES: 9.6 %
MPV: 10.3 FL (ref 7.5–12.5)
NEUTROPHILS: 55.9 %
PLATELET COUNT: 190 THOUSAND/UL (ref 140–400)
POTASSIUM: 4.7 MMOL/L (ref 3.5–5.3)
PSA, TOTAL: 4.93 NG/ML
RDW: 13.2 % (ref 11–15)
RED BLOOD CELL COUNT: 4.45 MILLION/UL (ref 4.2–5.8)
SODIUM: 139 MMOL/L (ref 135–146)
WHITE BLOOD CELL COUNT: 4.3 THOUSAND/UL (ref 3.8–10.8)

## (undated) DEVICE — 1200CC GUARDIAN II: Brand: GUARDIAN

## (undated) DEVICE — 3M™ RED DOT™ MONITORING ELECTRODE WITH FOAM TAPE AND STICKY GEL, 50/BAG, 20/CASE, 72/PLT 2570: Brand: RED DOT™

## (undated) DEVICE — FILTERLINE NASAL ADULT O2/CO2

## (undated) DEVICE — ENDOSCOPY PACK UPPER: Brand: MEDLINE INDUSTRIES, INC.

## (undated) DEVICE — FORCEP BIOPSY RJ4 LG CAP W/ND

## (undated) DEVICE — ENDOSCOPY PACK - LOWER: Brand: MEDLINE INDUSTRIES, INC.

## (undated) DEVICE — Device: Brand: DEFENDO AIR/WATER/SUCTION AND BIOPSY VALVE

## (undated) NOTE — MR AVS SNAPSHOT
[place]  [address] [facility] [address]  [address] [facility] [id number] [phone]               Thank you for choosing us for your health care visit with Marlen Lynch MD.  We are glad to serve you and happy to provide you with this summ 35.0-35.9,adult [Z68.35], Morbid obesity due to excess calories (HCC) [E66.01], Coronary artery disease involving native coronary artery of native heart without angina pectoris [I25.10], Impaired fasting glucose [R73.01], Mixed hyperlipidemia [E78.2], Iron unspecified iron deficiency anemia type [D50.9], Elevated PSA [R97.20], Facet arthritis of lumbar region ([facility] [address] [M46.96]           Ferritin [E]    Complete by:   May 23, 2017 (Approximate)    Assoc Dx:  Encounter for annual health examination [Z00.00], BMI 3 Phone:  [phone]   Fax:  [phone]    Diagnoses:  Eye exam, routine   Order:  Ophthalmology - Internal    [facility], [address] [place] [address]   Phone:  [phone]   Fax:  [phone]         Referral Orders      Normal Orders Assoc Dx:  Elevated PSA [R97.20]          Reason for Today's Visit     Physical           Medical Issues Discussed Today     BMI 35.0-35.9,adult    Facet arthritis of lumbar region    Iron deficiency anemia    Morbid obesity    Severe obesity (BMI 35.0-39 This list is accurate as of: 5/23/17  2:09 PM.  Always use your most recent med list.                aspirin 81 MG Tabs   Take 81 mg by mouth daily. Pravastatin Sodium 40 MG Tabs   Take 1 tablet (40 mg total) by mouth nightly.    Commonly known as Tips for making healthy food choices  -   Enjoy your food, but eat less. Fully enjoy your food when eating. Don’t eat while distracted and slow down. Avoid over sized portions. Don’t eat while when you’re bored.      EAT THESE FOODS MORE OFTEN: EAT T

## (undated) NOTE — LETTER
03/01/19        [address] [place] [address]  Jolene Arteaga [address] [id number]      Dear Mireya Alvarado records indicate that you have outstanding lab work and or testing that was ordered for you and has not yet been completed: Fasting lab work  To complete the l

## (undated) NOTE — LETTER
05/23/19        [address] [place] [address]  Jolene Arteaga [address] [id number]      Dear Jose Reyes records indicate that you have outstanding lab work and or testing that was ordered for you and has not yet been completed: Repeat Calcium Level (Non fasting l